# Patient Record
Sex: MALE | Race: BLACK OR AFRICAN AMERICAN | URBAN - METROPOLITAN AREA
[De-identification: names, ages, dates, MRNs, and addresses within clinical notes are randomized per-mention and may not be internally consistent; named-entity substitution may affect disease eponyms.]

---

## 2023-12-13 ENCOUNTER — TRANSCRIBE ORDERS (OUTPATIENT)
Facility: HOSPITAL | Age: 83
End: 2023-12-13

## 2023-12-13 DIAGNOSIS — R31.0 GROSS HEMATURIA: Primary | ICD-10-CM

## 2024-02-07 ENCOUNTER — HOSPITAL ENCOUNTER (OUTPATIENT)
Facility: HOSPITAL | Age: 84
Discharge: HOME | End: 2024-02-10
Attending: UROLOGY
Payer: MEDICARE

## 2024-02-07 DIAGNOSIS — R31.0 GROSS HEMATURIA: ICD-10-CM

## 2024-02-07 LAB — CREAT BLD-MCNC: 1.2 MG/DL (ref 0.6–1.3)

## 2024-02-07 PROCEDURE — 6360000004 HC RX CONTRAST MEDICATION: Performed by: UROLOGY

## 2024-02-07 PROCEDURE — 82565 ASSAY OF CREATININE: CPT

## 2024-02-07 PROCEDURE — 74178 CT ABD&PLV WO CNTR FLWD CNTR: CPT

## 2024-02-07 RX ADMIN — IOPAMIDOL 100 ML: 755 INJECTION, SOLUTION INTRAVENOUS at 09:47

## 2025-02-21 ENCOUNTER — HOSPITAL ENCOUNTER (INPATIENT)
Facility: HOSPITAL | Age: 85
LOS: 6 days | Discharge: HOME | DRG: 180 | End: 2025-03-01
Attending: STUDENT IN AN ORGANIZED HEALTH CARE EDUCATION/TRAINING PROGRAM | Admitting: HOSPITALIST
Payer: MEDICARE

## 2025-02-21 ENCOUNTER — APPOINTMENT (OUTPATIENT)
Facility: HOSPITAL | Age: 85
DRG: 180 | End: 2025-02-21
Payer: MEDICARE

## 2025-02-21 DIAGNOSIS — R91.8 LUNG MASS: ICD-10-CM

## 2025-02-21 DIAGNOSIS — J90 PLEURAL EFFUSION: ICD-10-CM

## 2025-02-21 DIAGNOSIS — C67.9 MALIGNANT NEOPLASM OF URINARY BLADDER, UNSPECIFIED SITE (HCC): ICD-10-CM

## 2025-02-21 DIAGNOSIS — R09.02 HYPOXEMIA: Primary | ICD-10-CM

## 2025-02-21 PROBLEM — J96.00 ACUTE RESPIRATORY FAILURE (HCC): Status: ACTIVE | Noted: 2025-02-21

## 2025-02-21 PROBLEM — Z85.51 HISTORY OF BLADDER CANCER: Status: ACTIVE | Noted: 2025-02-21

## 2025-02-21 PROBLEM — K21.9 GASTROESOPHAGEAL REFLUX DISEASE: Status: ACTIVE | Noted: 2025-02-21

## 2025-02-21 PROBLEM — Z86.0100 HISTORY OF COLON POLYPS: Status: ACTIVE | Noted: 2025-02-21

## 2025-02-21 PROBLEM — H91.90 HEARING LOSS: Status: ACTIVE | Noted: 2025-02-21

## 2025-02-21 LAB
ALBUMIN SERPL-MCNC: 2.9 G/DL (ref 3.5–5)
ALBUMIN SERPL-MCNC: 3 G/DL (ref 3.5–5)
ALBUMIN/GLOB SERPL: 0.6 (ref 1.1–2.2)
ALBUMIN/GLOB SERPL: 0.6 (ref 1.1–2.2)
ALP SERPL-CCNC: 44 U/L (ref 45–117)
ALP SERPL-CCNC: 44 U/L (ref 45–117)
ALT SERPL-CCNC: 9 U/L (ref 12–78)
ALT SERPL-CCNC: 9 U/L (ref 12–78)
ANION GAP SERPL CALC-SCNC: 2 MMOL/L (ref 2–12)
ANION GAP SERPL CALC-SCNC: 3 MMOL/L (ref 2–12)
APPEARANCE UR: CLEAR
APTT PPP: 27.1 SEC (ref 21.2–34.1)
AST SERPL W P-5'-P-CCNC: 12 U/L (ref 15–37)
AST SERPL W P-5'-P-CCNC: 14 U/L (ref 15–37)
BACTERIA URNS QL MICRO: NEGATIVE /HPF
BASOPHILS # BLD: 0.03 K/UL (ref 0–0.1)
BASOPHILS NFR BLD: 0.5 % (ref 0–1)
BILIRUB SERPL-MCNC: 0.8 MG/DL (ref 0.2–1)
BILIRUB SERPL-MCNC: 0.9 MG/DL (ref 0.2–1)
BILIRUB UR QL: NEGATIVE
BNP SERPL-MCNC: 693 PG/ML
BUN SERPL-MCNC: 14 MG/DL (ref 6–20)
BUN SERPL-MCNC: 17 MG/DL (ref 6–20)
BUN/CREAT SERPL: 13 (ref 12–20)
BUN/CREAT SERPL: 14 (ref 12–20)
CA-I BLD-MCNC: 8.9 MG/DL (ref 8.5–10.1)
CA-I BLD-MCNC: 9.1 MG/DL (ref 8.5–10.1)
CHLORIDE SERPL-SCNC: 105 MMOL/L (ref 97–108)
CHLORIDE SERPL-SCNC: 106 MMOL/L (ref 97–108)
CO2 SERPL-SCNC: 30 MMOL/L (ref 21–32)
CO2 SERPL-SCNC: 31 MMOL/L (ref 21–32)
COLOR UR: YELLOW
CREAT SERPL-MCNC: 1.12 MG/DL (ref 0.7–1.3)
CREAT SERPL-MCNC: 1.2 MG/DL (ref 0.7–1.3)
DIFFERENTIAL METHOD BLD: NORMAL
EKG ATRIAL RATE: 99 BPM
EKG DIAGNOSIS: NORMAL
EKG P AXIS: -14 DEGREES
EKG P-R INTERVAL: 146 MS
EKG Q-T INTERVAL: 360 MS
EKG QRS DURATION: 80 MS
EKG QTC CALCULATION (BAZETT): 462 MS
EKG R AXIS: 103 DEGREES
EKG T AXIS: 2 DEGREES
EKG VENTRICULAR RATE: 99 BPM
EOSINOPHIL # BLD: 0.12 K/UL (ref 0–0.4)
EOSINOPHIL NFR BLD: 1.8 % (ref 0–7)
EPITH CASTS URNS QL MICRO: ABNORMAL /LPF
ERYTHROCYTE [DISTWIDTH] IN BLOOD BY AUTOMATED COUNT: 13.1 % (ref 11.5–14.5)
GLOBULIN SER CALC-MCNC: 4.7 G/DL (ref 2–4)
GLOBULIN SER CALC-MCNC: 4.8 G/DL (ref 2–4)
GLUCOSE SERPL-MCNC: 161 MG/DL (ref 65–100)
GLUCOSE SERPL-MCNC: 82 MG/DL (ref 65–100)
GLUCOSE UR STRIP.AUTO-MCNC: NEGATIVE MG/DL
HCT VFR BLD AUTO: 40.8 % (ref 36.6–50.3)
HGB BLD-MCNC: 13 G/DL (ref 12.1–17)
HGB UR QL STRIP: ABNORMAL
IMM GRANULOCYTES # BLD AUTO: 0.02 K/UL (ref 0–0.04)
IMM GRANULOCYTES NFR BLD AUTO: 0.3 % (ref 0–0.5)
INR PPP: 1 (ref 0.9–1.1)
KETONES UR QL STRIP.AUTO: NEGATIVE MG/DL
LACTATE BLD-SCNC: 1.11 MMOL/L (ref 0.4–2)
LEUKOCYTE ESTERASE UR QL STRIP.AUTO: NEGATIVE
LYMPHOCYTES # BLD: 2.54 K/UL (ref 0.8–3.5)
LYMPHOCYTES NFR BLD: 38.1 % (ref 12–49)
MAGNESIUM SERPL-MCNC: 1.8 MG/DL (ref 1.6–2.4)
MCH RBC QN AUTO: 29.2 PG (ref 26–34)
MCHC RBC AUTO-ENTMCNC: 31.9 G/DL (ref 30–36.5)
MCV RBC AUTO: 91.7 FL (ref 80–99)
MONOCYTES # BLD: 0.49 K/UL (ref 0–1)
MONOCYTES NFR BLD: 7.4 % (ref 5–13)
MUCOUS THREADS URNS QL MICRO: ABNORMAL /LPF
NEUTS SEG # BLD: 3.46 K/UL (ref 1.8–8)
NEUTS SEG NFR BLD: 51.9 % (ref 32–75)
NITRITE UR QL STRIP.AUTO: NEGATIVE
NRBC # BLD: 0 K/UL (ref 0–0.01)
NRBC BLD-RTO: 0 PER 100 WBC
PERFORMED BY:: NORMAL
PH UR STRIP: 6 (ref 5–8)
PLATELET # BLD AUTO: 232 K/UL (ref 150–400)
PMV BLD AUTO: 10.2 FL (ref 8.9–12.9)
POTASSIUM SERPL-SCNC: 4 MMOL/L (ref 3.5–5.1)
POTASSIUM SERPL-SCNC: 4.1 MMOL/L (ref 3.5–5.1)
PROT SERPL-MCNC: 7.6 G/DL (ref 6.4–8.2)
PROT SERPL-MCNC: 7.8 G/DL (ref 6.4–8.2)
PROT UR STRIP-MCNC: NEGATIVE MG/DL
PROTHROMBIN TIME: 13.8 SEC (ref 11.9–14.6)
RBC # BLD AUTO: 4.45 M/UL (ref 4.1–5.7)
RBC #/AREA URNS HPF: ABNORMAL /HPF (ref 0–5)
SODIUM SERPL-SCNC: 138 MMOL/L (ref 136–145)
SODIUM SERPL-SCNC: 139 MMOL/L (ref 136–145)
SP GR UR REFRACTOMETRY: 1.02 (ref 1–1.03)
THERAPEUTIC RANGE: NORMAL SEC (ref 82–109)
TROPONIN I SERPL HS-MCNC: 23 NG/L (ref 0–76)
URINE CULTURE IF INDICATED: ABNORMAL
UROBILINOGEN UR QL STRIP.AUTO: 0.1 EU/DL (ref 0.1–1)
WBC # BLD AUTO: 6.7 K/UL (ref 4.1–11.1)
WBC URNS QL MICRO: ABNORMAL /HPF (ref 0–4)

## 2025-02-21 PROCEDURE — 6360000004 HC RX CONTRAST MEDICATION: Performed by: EMERGENCY MEDICINE

## 2025-02-21 PROCEDURE — 70450 CT HEAD/BRAIN W/O DYE: CPT

## 2025-02-21 PROCEDURE — 85025 COMPLETE CBC W/AUTO DIFF WBC: CPT

## 2025-02-21 PROCEDURE — 6360000002 HC RX W HCPCS: Performed by: EMERGENCY MEDICINE

## 2025-02-21 PROCEDURE — 83735 ASSAY OF MAGNESIUM: CPT

## 2025-02-21 PROCEDURE — 93005 ELECTROCARDIOGRAM TRACING: CPT | Performed by: STUDENT IN AN ORGANIZED HEALTH CARE EDUCATION/TRAINING PROGRAM

## 2025-02-21 PROCEDURE — 2500000003 HC RX 250 WO HCPCS: Performed by: EMERGENCY MEDICINE

## 2025-02-21 PROCEDURE — 74177 CT ABD & PELVIS W/CONTRAST: CPT

## 2025-02-21 PROCEDURE — 84484 ASSAY OF TROPONIN QUANT: CPT

## 2025-02-21 PROCEDURE — 99285 EMERGENCY DEPT VISIT HI MDM: CPT

## 2025-02-21 PROCEDURE — 71045 X-RAY EXAM CHEST 1 VIEW: CPT

## 2025-02-21 PROCEDURE — 83605 ASSAY OF LACTIC ACID: CPT

## 2025-02-21 PROCEDURE — 87154 CUL TYP ID BLD PTHGN 6+ TRGT: CPT

## 2025-02-21 PROCEDURE — 6370000000 HC RX 637 (ALT 250 FOR IP): Performed by: HOSPITALIST

## 2025-02-21 PROCEDURE — G0378 HOSPITAL OBSERVATION PER HR: HCPCS

## 2025-02-21 PROCEDURE — 81001 URINALYSIS AUTO W/SCOPE: CPT

## 2025-02-21 PROCEDURE — 80053 COMPREHEN METABOLIC PANEL: CPT

## 2025-02-21 PROCEDURE — 85610 PROTHROMBIN TIME: CPT

## 2025-02-21 PROCEDURE — 87040 BLOOD CULTURE FOR BACTERIA: CPT

## 2025-02-21 PROCEDURE — 87077 CULTURE AEROBIC IDENTIFY: CPT

## 2025-02-21 PROCEDURE — 96374 THER/PROPH/DIAG INJ IV PUSH: CPT

## 2025-02-21 PROCEDURE — 36415 COLL VENOUS BLD VENIPUNCTURE: CPT

## 2025-02-21 PROCEDURE — 83880 ASSAY OF NATRIURETIC PEPTIDE: CPT

## 2025-02-21 PROCEDURE — 71275 CT ANGIOGRAPHY CHEST: CPT

## 2025-02-21 PROCEDURE — 2580000003 HC RX 258: Performed by: EMERGENCY MEDICINE

## 2025-02-21 PROCEDURE — 2500000003 HC RX 250 WO HCPCS: Performed by: HOSPITALIST

## 2025-02-21 PROCEDURE — 85730 THROMBOPLASTIN TIME PARTIAL: CPT

## 2025-02-21 RX ORDER — SODIUM CHLORIDE 9 MG/ML
INJECTION, SOLUTION INTRAVENOUS PRN
Status: DISCONTINUED | OUTPATIENT
Start: 2025-02-21 | End: 2025-03-01 | Stop reason: HOSPADM

## 2025-02-21 RX ORDER — MAGNESIUM SULFATE IN WATER 40 MG/ML
2000 INJECTION, SOLUTION INTRAVENOUS PRN
Status: DISCONTINUED | OUTPATIENT
Start: 2025-02-21 | End: 2025-03-01 | Stop reason: HOSPADM

## 2025-02-21 RX ORDER — IOPAMIDOL 755 MG/ML
100 INJECTION, SOLUTION INTRAVASCULAR
Status: COMPLETED | OUTPATIENT
Start: 2025-02-21 | End: 2025-02-21

## 2025-02-21 RX ORDER — POLYETHYLENE GLYCOL 3350 17 G/17G
17 POWDER, FOR SOLUTION ORAL DAILY PRN
Status: DISCONTINUED | OUTPATIENT
Start: 2025-02-21 | End: 2025-03-01 | Stop reason: HOSPADM

## 2025-02-21 RX ORDER — POTASSIUM CHLORIDE 1500 MG/1
40 TABLET, EXTENDED RELEASE ORAL PRN
Status: DISCONTINUED | OUTPATIENT
Start: 2025-02-21 | End: 2025-03-01 | Stop reason: HOSPADM

## 2025-02-21 RX ORDER — ONDANSETRON 2 MG/ML
4 INJECTION INTRAMUSCULAR; INTRAVENOUS EVERY 6 HOURS PRN
Status: DISCONTINUED | OUTPATIENT
Start: 2025-02-21 | End: 2025-03-01 | Stop reason: HOSPADM

## 2025-02-21 RX ORDER — TAMSULOSIN HYDROCHLORIDE 0.4 MG/1
0.4 CAPSULE ORAL DAILY
Status: DISCONTINUED | OUTPATIENT
Start: 2025-02-21 | End: 2025-03-01 | Stop reason: HOSPADM

## 2025-02-21 RX ORDER — ACETAMINOPHEN 325 MG/1
650 TABLET ORAL EVERY 6 HOURS PRN
Status: DISCONTINUED | OUTPATIENT
Start: 2025-02-21 | End: 2025-03-01 | Stop reason: HOSPADM

## 2025-02-21 RX ORDER — ONDANSETRON 4 MG/1
4 TABLET, ORALLY DISINTEGRATING ORAL EVERY 8 HOURS PRN
Status: DISCONTINUED | OUTPATIENT
Start: 2025-02-21 | End: 2025-03-01 | Stop reason: HOSPADM

## 2025-02-21 RX ORDER — TAMSULOSIN HYDROCHLORIDE 0.4 MG/1
0.4 CAPSULE ORAL DAILY
COMMUNITY

## 2025-02-21 RX ORDER — POTASSIUM CHLORIDE 7.45 MG/ML
10 INJECTION INTRAVENOUS PRN
Status: DISCONTINUED | OUTPATIENT
Start: 2025-02-21 | End: 2025-03-01 | Stop reason: HOSPADM

## 2025-02-21 RX ORDER — AMLODIPINE BESYLATE 5 MG/1
10 TABLET ORAL DAILY
Status: DISCONTINUED | OUTPATIENT
Start: 2025-02-21 | End: 2025-03-01 | Stop reason: HOSPADM

## 2025-02-21 RX ORDER — SODIUM CHLORIDE 0.9 % (FLUSH) 0.9 %
5-40 SYRINGE (ML) INJECTION PRN
Status: DISCONTINUED | OUTPATIENT
Start: 2025-02-21 | End: 2025-03-01 | Stop reason: HOSPADM

## 2025-02-21 RX ORDER — AMLODIPINE BESYLATE 10 MG/1
10 TABLET ORAL DAILY
COMMUNITY

## 2025-02-21 RX ORDER — ACETAMINOPHEN 650 MG/1
650 SUPPOSITORY RECTAL EVERY 6 HOURS PRN
Status: DISCONTINUED | OUTPATIENT
Start: 2025-02-21 | End: 2025-03-01 | Stop reason: HOSPADM

## 2025-02-21 RX ORDER — SODIUM CHLORIDE 0.9 % (FLUSH) 0.9 %
5-40 SYRINGE (ML) INJECTION EVERY 12 HOURS SCHEDULED
Status: DISCONTINUED | OUTPATIENT
Start: 2025-02-21 | End: 2025-03-01 | Stop reason: HOSPADM

## 2025-02-21 RX ORDER — ENOXAPARIN SODIUM 100 MG/ML
40 INJECTION SUBCUTANEOUS DAILY
Status: DISCONTINUED | OUTPATIENT
Start: 2025-02-21 | End: 2025-03-01 | Stop reason: HOSPADM

## 2025-02-21 RX ORDER — 0.9 % SODIUM CHLORIDE 0.9 %
1000 INTRAVENOUS SOLUTION INTRAVENOUS
Status: COMPLETED | OUTPATIENT
Start: 2025-02-21 | End: 2025-02-21

## 2025-02-21 RX ORDER — ASPIRIN 81 MG/1
81 TABLET, CHEWABLE ORAL DAILY
COMMUNITY
End: 2025-02-21

## 2025-02-21 RX ADMIN — SODIUM CHLORIDE 1000 ML: 0.9 INJECTION, SOLUTION INTRAVENOUS at 11:09

## 2025-02-21 RX ADMIN — CEFTRIAXONE SODIUM 2000 MG: 2 INJECTION, POWDER, FOR SOLUTION INTRAMUSCULAR; INTRAVENOUS at 11:08

## 2025-02-21 RX ADMIN — AMLODIPINE BESYLATE 10 MG: 5 TABLET ORAL at 20:45

## 2025-02-21 RX ADMIN — IOPAMIDOL 100 ML: 755 INJECTION, SOLUTION INTRAVENOUS at 11:39

## 2025-02-21 RX ADMIN — TAMSULOSIN HYDROCHLORIDE 0.4 MG: 0.4 CAPSULE ORAL at 20:45

## 2025-02-21 RX ADMIN — SODIUM CHLORIDE, PRESERVATIVE FREE 10 ML: 5 INJECTION INTRAVENOUS at 20:45

## 2025-02-21 ASSESSMENT — PAIN - FUNCTIONAL ASSESSMENT: PAIN_FUNCTIONAL_ASSESSMENT: 0-10

## 2025-02-21 ASSESSMENT — LIFESTYLE VARIABLES
HOW OFTEN DO YOU HAVE A DRINK CONTAINING ALCOHOL: NEVER
HOW MANY STANDARD DRINKS CONTAINING ALCOHOL DO YOU HAVE ON A TYPICAL DAY: PATIENT DOES NOT DRINK

## 2025-02-21 ASSESSMENT — PAIN SCALES - GENERAL: PAINLEVEL_OUTOF10: 0

## 2025-02-21 NOTE — ED PROVIDER NOTES
[facility] EMERGENCY DEPT  EMERGENCY DEPARTMENT HISTORY AND PHYSICAL EXAM      Date: 2/21/2025  Patient Name: Donny Delgadillo  MRN: [id number]  Birthdate 1940  Date of evaluation: 2/21/2025  Provider: Tyrell Villanueva MD   Note Started: 10:27 AM EST 2/21/25    HISTORY OF PRESENT ILLNESS     Chief Complaint   Patient presents with    hypoxia       History Provided By: Patient    HPI: Donny Delgadillo is a 84 y.o. male presents with hypoxia.  He states that 3 weeks ago he had a cystoscopy diagnosing bladder cancer by his urologist.  He has had some right rib pain posterior lateral ribs and right lateral ribs and right upper flank since then shortness of breath on exertion.  Denies chest pain cough fevers.  Went to his urologist today for follow-up procedure to assess the extent of cancer minutes he was hypoxic and sent into the ER.    PAST MEDICAL HISTORY   Past Medical History:  Past Medical History:   Diagnosis Date    Bladder cancer (HCC)     BPH with urinary obstruction     Diverticulosis of colon without hemorrhage     Essential hypertension     Gastroesophageal reflux disease 02/21/2025    Hearing loss 02/21/2025    History of colon polyps 02/21/2025       Past Surgical History:  No past surgical history on file.    Family History:  No family history on file.    Social History:       Allergies:  No Known Allergies    PCP: Radha Pena MD    Current Meds:   No current facility-administered medications for this encounter.     Current Outpatient Medications   Medication Sig Dispense Refill    tamsulosin (FLOMAX) 0.4 MG capsule Take 1 capsule by mouth daily      amLODIPine (NORVASC) 10 MG tablet Take 1 tablet by mouth daily         Social Determinants of Health:   Social Determinants of Health     Tobacco Use: Not on file   Alcohol Use: Not At Risk (2/21/2025)    AUDIT-C     Frequency of Alcohol Consumption: Never     Average Number of Drinks: Patient does not drink     Frequency of Binge Drinking: Never  reason(s) for their admission have been discussed with pt and/or available family. They convey agreement and understanding for the need to be admitted and for the admission diagnosis.     PATIENT REFERRED TO:  No follow-up provider specified.      DISCHARGE MEDICATIONS:     Medication List        ASK your doctor about these medications      amLODIPine 10 MG tablet  Commonly known as: NORVASC     tamsulosin 0.4 MG capsule  Commonly known as: FLOMAX                DISCONTINUED MEDICATIONS:  Current Discharge Medication List        STOP taking these medications       aspirin 81 MG chewable tablet Comments:   Reason for Stopping:               I am the Primary Clinician of Record. Tyrell Villanueva MD (electronically signed)    (Please note that parts of this dictation were completed with voice recognition software. Quite often unanticipated grammatical, syntax, homophones, and other interpretive errors are inadvertently transcribed by the computer software. Please disregards these errors. Please excuse any errors that have escaped final proofreading.)      Tyrell Villanueva MD  02/21/25 [id number]

## 2025-02-21 NOTE — PROGRESS NOTES
4 Eyes Skin Assessment     NAME:  Donny Delgadillo  YOB: 1940  MEDICAL RECORD NUMBER:  [id number]    The patient is being assessed for  Admission    I agree that at least one RN has performed a thorough Head to Toe Skin Assessment on the patient. ALL assessment sites listed below have been assessed.      Areas assessed by both nurses:    Head, Face, Ears, Shoulders, Back, Chest, Arms, Elbows, Hands, Sacrum. Buttock, Coccyx, Ischium, Legs. Feet and Heels, and Under Medical Devices         Does the Patient have a Wound? No noted wound(s)       Shaquille Prevention initiated by RN: Yes  Wound Care Orders initiated by RN: No    Pressure Injury (Stage 3,4, Unstageable, DTI, NWPT, and Complex wounds) if present, place Wound referral order by RN under : No    New Ostomies, if present place, Ostomy referral order under : No     Nurse 1 eSignature: Electronically signed by TASHIA DIA RN on 2/21/25 at 6:26 PM EST    **SHARE this note so that the co-signing nurse can place an eSignature**    Nurse 2 eSignature: Electronically signed by Mike Koo RN on 2/21/25 at 7:13 PM EST

## 2025-02-21 NOTE — ASSESSMENT & PLAN NOTE
- Known hx s/p reported surgical resection w/ radiographic evidence of asymmetric left bladder wall thickening on CT A/P  - Outpt Urology records and pathology requested  - Monitor UOP and for bleeding

## 2025-02-21 NOTE — ED TRIAGE NOTES
Pt went to urologist surgery office for a bladder biopsy and the pt was told to come to the hospital for low vital signs and they were not going to do the procedure today

## 2025-02-21 NOTE — ASSESSMENT & PLAN NOTE
- Baseline lung fxn unclear w/ acute onset hypoxia identified and no acute reactive lung dz present on exam  - Underlying Pleural effusions the suspected etiology with underlying malignancy the suspected etiology  - Monitor O2 support s/p thoracentesis

## 2025-02-21 NOTE — ASSESSMENT & PLAN NOTE
- Large RUL mass w/ mediastinal/hilar lymphadenopathy & secondary moderate right pleural effusion noted on CT chest  - Metastatic dz with secondary malignant effusion suspected w/ low suspicion for PNA @ this time  - IR consulted for thoracentesis and fluid studies/cytology ordered  - Additional inpt Urology evaluation per daytime Hospitalist team

## 2025-02-21 NOTE — ASSESSMENT & PLAN NOTE
- Chronic presentation w/o evidence of acute obstruction on CT A/P  - Resume Alpha blocker and monitor PVR

## 2025-02-21 NOTE — H&P
V2.0  History and Physical      Name:  Donny Delgadillo /Age/Sex: 1940  (84 y.o. male)   MRN & CSN:  [id number] & [id number] Encounter Date/Time: 25   Location:  [facility]/ PCP: Radha Pena MD       Hospital Day: 1    Assessment and Plan:   Donny Delgadillo is a 84 y.o. male with a pmh of bladder cancer who presents with Acute respiratory failure (HCC)    Hospital Problems             Last Modified POA    * (Principal) Acute respiratory failure (HCC) 2025 Yes    Lung mass with pleural effusion 2025 Yes    Malignant neoplasm of urinary bladder (HCC) 2025 Yes    Essential hypertension 2025 Yes    BPH with urinary obstruction 2025 Yes     Plan:  Lung mass with pleural effusion  - Large RUL mass w/ mediastinal/hilar lymphadenopathy & secondary moderate right pleural effusion noted on CT chest  - Metastatic dz with secondary malignant effusion suspected w/ low suspicion for PNA @ this time  - IR consulted for thoracentesis and fluid studies/cytology ordered  - Additional inpt Urology evaluation per daytime Hospitalist team    Acute respiratory failure (HCC)  - Baseline lung fxn unclear w/ acute onset hypoxia identified and no acute reactive lung dz present on exam  - Underlying Pleural effusions the suspected etiology with underlying malignancy the suspected etiology  - Monitor O2 support s/p thoracentesis    Essential hypertension  - Chronic presentation w/ SBP stable & home regimen reviewed  - Titrate for goal SBP<140     BPH with urinary obstruction  - Chronic presentation w/o evidence of acute obstruction on CT A/P  - Resume Alpha blocker and monitor PVR    Malignant neoplasm of urinary bladder (HCC)  - Known hx s/p reported surgical resection w/ radiographic evidence of asymmetric left bladder wall thickening on CT A/P  - Outpt Urology records and pathology requested  - Monitor UOP and for bleeding    Disposition:   Current Living situation: home  Expected Disposition: Home  cortical cysts, nonobstructing left intrarenal calculus, asymmetric left bladder wall thickening which may correlate with recently diagnosed bladder cancer. Other incidental findings described above. Electronically signed by AMANDA HOLLINGSWORTH    CT HEAD WO CONTRAST    Result Date: 2/21/2025  INDICATION: any sign of metastatic disease? Exam: Noncontrast CT of the brain is performed with 5 mm collimation. CT dose reduction was achieved with the use of the standardized protocol tailored for this examination and automatic exposure control for dose modulation. FINDINGS: There is no acute intracranial hemorrhage, mass, mass effect or herniation. Ventricular system is normal. The gray-white matter differentiation is well-preserved. The mastoid air cells are well pneumatized. The visualized paranasal sinuses are normal.     No acute intracranial hemorrhage, mass or infarct. Electronically signed by Jackson Mlegar MD    XR CHEST 1 VIEW    Result Date: 2/21/2025  EXAM: XR CHEST 1 VIEW INDICATION: Shortness of Breath COMPARISON: 4/15/2015. FINDINGS: A single view of the chest demonstrates a 7.9 x 4.3 cm oval opacity in the right upper lobe. There is a small right pleural effusion. Bilateral lower lobe pleuroparenchymal scarring versus atelectasis is noted. The cardiac and mediastinal contours and pulmonary vascularity are normal. The bones and soft tissues are within normal limits.     Large oval opacity right upper lobe is concerning for neoplasm. Correlation with nonemergent contrast-enhanced chest CT is recommended. Small right pleural effusion and bilateral lower lobe pleuroparenchymal scarring versus atelectasis. Electronically signed by RITU SNOW    Electronically signed by BREN WYNNE DO on 2/21/2025 at 4:37 PM

## 2025-02-22 ENCOUNTER — HOSPITAL ENCOUNTER (OUTPATIENT)
Facility: HOSPITAL | Age: 85
Setting detail: OBSERVATION
Discharge: HOME | End: 2025-02-25
Payer: MEDICARE

## 2025-02-22 LAB
ALBUMIN SERPL-MCNC: 2.8 G/DL (ref 3.5–5)
ALBUMIN/GLOB SERPL: 0.6 (ref 1.1–2.2)
ALP SERPL-CCNC: 39 U/L (ref 45–117)
ALT SERPL-CCNC: 10 U/L (ref 12–78)
ANION GAP SERPL CALC-SCNC: 1 MMOL/L (ref 2–12)
AST SERPL W P-5'-P-CCNC: 14 U/L (ref 15–37)
BILIRUB SERPL-MCNC: 0.8 MG/DL (ref 0.2–1)
BUN SERPL-MCNC: 13 MG/DL (ref 6–20)
BUN/CREAT SERPL: 13 (ref 12–20)
CA-I BLD-MCNC: 9.3 MG/DL (ref 8.5–10.1)
CHLORIDE SERPL-SCNC: 105 MMOL/L (ref 97–108)
CO2 SERPL-SCNC: 31 MMOL/L (ref 21–32)
CREAT SERPL-MCNC: 1.01 MG/DL (ref 0.7–1.3)
ERYTHROCYTE [DISTWIDTH] IN BLOOD BY AUTOMATED COUNT: 12.9 % (ref 11.5–14.5)
FLUAV RNA SPEC QL NAA+PROBE: NOT DETECTED
FLUBV RNA SPEC QL NAA+PROBE: NOT DETECTED
GLOBULIN SER CALC-MCNC: 4.6 G/DL (ref 2–4)
GLUCOSE SERPL-MCNC: 87 MG/DL (ref 65–100)
HCT VFR BLD AUTO: 38.9 % (ref 36.6–50.3)
HGB BLD-MCNC: 12.4 G/DL (ref 12.1–17)
MCH RBC QN AUTO: 29.2 PG (ref 26–34)
MCHC RBC AUTO-ENTMCNC: 31.9 G/DL (ref 30–36.5)
MCV RBC AUTO: 91.5 FL (ref 80–99)
NRBC # BLD: 0 K/UL (ref 0–0.01)
NRBC BLD-RTO: 0 PER 100 WBC
PLATELET # BLD AUTO: 227 K/UL (ref 150–400)
PMV BLD AUTO: 10.1 FL (ref 8.9–12.9)
POTASSIUM SERPL-SCNC: 4.3 MMOL/L (ref 3.5–5.1)
PROCALCITONIN SERPL-MCNC: <0.05 NG/ML
PROT SERPL-MCNC: 7.4 G/DL (ref 6.4–8.2)
RBC # BLD AUTO: 4.25 M/UL (ref 4.1–5.7)
SARS-COV-2 RNA RESP QL NAA+PROBE: NOT DETECTED
SODIUM SERPL-SCNC: 137 MMOL/L (ref 136–145)
WBC # BLD AUTO: 5.3 K/UL (ref 4.1–11.1)

## 2025-02-22 PROCEDURE — A9577 INJ MULTIHANCE: HCPCS | Performed by: STUDENT IN AN ORGANIZED HEALTH CARE EDUCATION/TRAINING PROGRAM

## 2025-02-22 PROCEDURE — 6370000000 HC RX 637 (ALT 250 FOR IP): Performed by: INTERNAL MEDICINE

## 2025-02-22 PROCEDURE — 6370000000 HC RX 637 (ALT 250 FOR IP): Performed by: HOSPITALIST

## 2025-02-22 PROCEDURE — 94761 N-INVAS EAR/PLS OXIMETRY MLT: CPT

## 2025-02-22 PROCEDURE — 72157 MRI CHEST SPINE W/O & W/DYE: CPT

## 2025-02-22 PROCEDURE — 84145 PROCALCITONIN (PCT): CPT

## 2025-02-22 PROCEDURE — G0378 HOSPITAL OBSERVATION PER HR: HCPCS

## 2025-02-22 PROCEDURE — 87636 SARSCOV2 & INF A&B AMP PRB: CPT

## 2025-02-22 PROCEDURE — 80053 COMPREHEN METABOLIC PANEL: CPT

## 2025-02-22 PROCEDURE — 6360000004 HC RX CONTRAST MEDICATION: Performed by: STUDENT IN AN ORGANIZED HEALTH CARE EDUCATION/TRAINING PROGRAM

## 2025-02-22 PROCEDURE — 72158 MRI LUMBAR SPINE W/O & W/DYE: CPT

## 2025-02-22 PROCEDURE — 72156 MRI NECK SPINE W/O & W/DYE: CPT

## 2025-02-22 PROCEDURE — 2500000003 HC RX 250 WO HCPCS: Performed by: HOSPITALIST

## 2025-02-22 PROCEDURE — 36415 COLL VENOUS BLD VENIPUNCTURE: CPT

## 2025-02-22 PROCEDURE — 94640 AIRWAY INHALATION TREATMENT: CPT

## 2025-02-22 PROCEDURE — 85027 COMPLETE CBC AUTOMATED: CPT

## 2025-02-22 RX ORDER — IPRATROPIUM BROMIDE AND ALBUTEROL SULFATE 2.5; .5 MG/3ML; MG/3ML
1 SOLUTION RESPIRATORY (INHALATION)
Status: DISCONTINUED | OUTPATIENT
Start: 2025-02-22 | End: 2025-02-24

## 2025-02-22 RX ORDER — DOXYCYCLINE 100 MG/1
100 CAPSULE ORAL EVERY 12 HOURS SCHEDULED
Status: DISCONTINUED | OUTPATIENT
Start: 2025-02-22 | End: 2025-03-01 | Stop reason: HOSPADM

## 2025-02-22 RX ORDER — PREDNISONE 20 MG/1
40 TABLET ORAL 2 TIMES DAILY
Status: DISCONTINUED | OUTPATIENT
Start: 2025-02-22 | End: 2025-02-23

## 2025-02-22 RX ORDER — ALBUTEROL SULFATE 90 UG/1
2 INHALANT RESPIRATORY (INHALATION)
Status: DISCONTINUED | OUTPATIENT
Start: 2025-02-23 | End: 2025-02-22

## 2025-02-22 RX ORDER — ALBUTEROL SULFATE 90 UG/1
2 INHALANT RESPIRATORY (INHALATION) EVERY 6 HOURS PRN
Status: DISCONTINUED | OUTPATIENT
Start: 2025-02-23 | End: 2025-03-01 | Stop reason: HOSPADM

## 2025-02-22 RX ADMIN — SODIUM CHLORIDE, PRESERVATIVE FREE 10 ML: 5 INJECTION INTRAVENOUS at 20:35

## 2025-02-22 RX ADMIN — PREDNISONE 40 MG: 20 TABLET ORAL at 20:35

## 2025-02-22 RX ADMIN — AMLODIPINE BESYLATE 10 MG: 5 TABLET ORAL at 15:10

## 2025-02-22 RX ADMIN — DOXYCYCLINE HYCLATE 100 MG: 100 CAPSULE ORAL at 20:35

## 2025-02-22 RX ADMIN — GADOBENATE DIMEGLUMINE 13 ML: 529 INJECTION, SOLUTION INTRAVENOUS at 18:41

## 2025-02-22 RX ADMIN — IPRATROPIUM BROMIDE AND ALBUTEROL SULFATE 1 DOSE: 2.5; .5 SOLUTION RESPIRATORY (INHALATION) at 22:14

## 2025-02-22 RX ADMIN — TAMSULOSIN HYDROCHLORIDE 0.4 MG: 0.4 CAPSULE ORAL at 15:10

## 2025-02-22 ASSESSMENT — PAIN SCALES - WONG BAKER: WONGBAKER_NUMERICALRESPONSE: NO HURT

## 2025-02-22 NOTE — CONSULTS
Pulmonology Consult    Subjective:   Consult Note: 2/22/2025 4:21 PM    Chief Complaint:   Chief Complaint   Patient presents with    hypoxia        This patient has been seen and evaluated at the request of Dr. Villanueva.     Patient is an 84-year-old male with a history of COPD, GERD, bladder cancer, BPH, and hypertension who presented to the hospital with hypoxemia and was found to have a large right upper lobe mass on chest imaging.  Patient seen and examined in his room on the floor this afternoon, case discussed in detail with the patient at the bedside.  Presented to the hospital with shortness of breath, placed on 1.5 L nasal cannula.  Please wean off for goal sats greater than 90% on room air, patient will need a walking O2 test prior to discharge.  Physical therapy consulted today, TTE currently pending.  Lactate 1.1, proBNP level 693, troponin negative x 1, LFTs normal, procalcitonin less than 0.05, INR 1.0, CBC/BMP relatively unremarkable.  Expanded pulmonary infectious workup pending, CT head on admission unremarkable.  CTA chest/abdomen/pelvis shows a large right upper lobe mass measuring 9.0 x 4.3 cm with surrounding nodularity, severe emphysema and bullae bilaterally, a small to moderate right-sided pleural effusion, and mediastinal/hilar adenopathy.  All of these findings are extremely concerning for underlying malignancy.  The pleural effusion is really not terribly large on CT imaging, but will consult IR for an evaluation for thoracentesis on Monday, all the appropriate pleural fluid labs have been ordered, patient will need a postprocedural chest x-ray.  If a thoracentesis is performed and the cytology is positive for malignancy, no biopsy needed, but I suspect that he will still need a CT-guided biopsy.  Additionally, we will also ask IR to evaluate for candidacy of right upper lobe mass biopsy.  He has severe emphysema, but it appears that the mass abuts the chest wall which would reduce the risk  quickly over the weekend.  -Encouragement given for quitting smoking  -Needs close outpatient pulmonary follow-up with PFTs after discharge    3.)  Right upper lobe mass  -CTA chest/abdomen/pelvis shows a large right upper lobe mass measuring 9.0 x 4.3 cm with surrounding nodularity, severe emphysema and bullae bilaterally, a small to moderate right-sided pleural effusion, and mediastinal/hilar adenopathy.    -All of these findings are extremely concerning for underlying malignancy.    -The pleural effusion is really not terribly large on CT imaging, but will consult IR for an evaluation for thoracentesis on Monday, all the appropriate pleural fluid labs have been ordered, patient will need a postprocedural chest x-ray.   -If a thoracentesis is performed and the cytology is positive for malignancy, no biopsy needed, but I suspect that he will still need a CT-guided biopsy.    -Additionally, we will also ask IR to evaluate for candidacy of right upper lobe mass biopsy.  He has severe emphysema, but it appears that the mass abuts the chest wall which would reduce the risk of pneumothorax.  Will await their evaluation on Monday.  -Oncology consulted as well    4.)  Right pleural effusion  -Highly concerning for malignant effusion given lung mass.  -IR consulted for right-sided thoracentesis on Monday  -All the appropriate pleural fluid labs have been ordered, patient will need a postprocedural chest x-ray    5.)  Bladder cancer  -Reportedly had a recent cystoscopy with diagnosis.  -Oncology consulted    6.)  Ex-smoker  -Long history of smoking, but patient has already quit, significant encouragement given.  -Strongly recommend outpatient pulmonary follow-up with PFTs      CODE STATUS: Full Code       Disposition and Family: Stable to transfer to floor.    Total time spent with patient: 55 mins      Caden Fallon DO  [facility] (PAT)  2/22/2025  4:21 PM

## 2025-02-22 NOTE — CARDIO/PULMONARY
Consult received, thank you. Note to follow.      Caden Fallon DO  [facility] ([facility])

## 2025-02-22 NOTE — PLAN OF CARE
Problem: Discharge Planning  Goal: Discharge to home or other facility with appropriate resources  2/22/2025 1146 by Falguni Turner RN  Outcome: Progressing  Flowsheets (Taken 2/22/2025 0940)  Discharge to home or other facility with appropriate resources:   Identify barriers to discharge with patient and caregiver   Arrange for needed discharge resources and transportation as appropriate   Identify discharge learning needs (meds, wound care, etc)  2/21/2025 2203 by Laura Hyman RN  Outcome: Progressing     Problem: Pain  Goal: Verbalizes/displays adequate comfort level or baseline comfort level  2/22/2025 1146 by Falguni Turner RN  Outcome: Progressing  2/21/2025 2203 by Laura Hyman RN  Outcome: Progressing     Problem: ABCDS Injury Assessment  Goal: Absence of physical injury  2/22/2025 1146 by Falguni Turner RN  Outcome: Progressing  2/21/2025 2203 by Laura Hyman RN  Outcome: Progressing     Problem: Respiratory - Adult  Goal: Achieves optimal ventilation and oxygenation  Outcome: Progressing     Problem: Skin/Tissue Integrity - Adult  Goal: Skin integrity remains intact  Outcome: Progressing  Goal: Incisions, wounds, or drain sites healing without S/S of infection  Outcome: Progressing  Goal: Oral mucous membranes remain intact  Outcome: Progressing

## 2025-02-22 NOTE — CARE COORDINATION
02/22/25 0929   Service Assessment   Patient Orientation Alert and Oriented   Cognition Alert   History Provided By Patient   Primary Caregiver Self   Support Systems Children   Patient's Healthcare Decision Maker is: Legal Next of Kin   PCP Verified by CM Yes   Last Visit to PCP Within last 3 months   Prior Functional Level Independent in ADLs/IADLs   Current Functional Level Independent in ADLs/IADLs   Can patient return to prior living arrangement Yes   Ability to make needs known: Good   Family able to assist with home care needs: Yes   Would you like for me to discuss the discharge plan with any other family members/significant others, and if so, who? Yes  (daughter)   Financial Resources Medicare   Community Resources None   Social/Functional History   Lives With Alone   Type of Home House   Home Layout One level   Discharge Planning   Patient expects to be discharged to: House   Services At/After Discharge   Confirm Follow Up Transport Self     CM met with the patient at the bedside. He confirmed demographics as correct. He lives alone in a one story house. Independent in ADL's. Denies using any DME. Drives himself to appointments.     Patient is currently on oxygen but does not use at baseline. CM will follow for any oxygen needs.    Advance Care Planning     General Advance Care Planning (ACP) Conversation    Date of Conversation: 2/22/2025  Conducted with: Patient with Decision Making Capacity  Other persons present: None    Healthcare Decision Maker:   Primary Decision Maker: RAFAL LINTON - Child - [phone]       Gen Garcia

## 2025-02-22 NOTE — PROGRESS NOTES
Hospitalist Progress Note               Daily Progress Note: 2/22/2025      Hospital Day: 2     Chief complaint:   Chief Complaint   Patient presents with    hypoxia        Brief HPI/ Hospital course to date:  Donny Delgadillo is a 84 y.o. male with a pmh of bladder cancer who presents with Acute respiratory failure (HCC).  History obtained from patient but limited outpt records available.  Pt described progressive symtoms of POTTER w/o pleurisy or hemoptysis.  Pt was afebrile, hemodynamically stable but hypoxic on arrival to ED after being evaluated by his outpt urologist.  Lung mass and effusion were noted on follow up CT chest and labwork stable.  Pt in fair condition during admission     --------  Patient is seen today for follow-up.   He denies headache, chest pain/palpitations, worsening shortness of breath, abdominal pain, urinary symptoms.  Family at bedside and all questions answered.  Patient otherwise has no complaints.  Nursing at bedside no concerns.      All ROS negative otherwise mentioned above.      Assessment and Plan:    Acute respiratory failure with hypoxia 2/2 to Pleural Effusion  -Patient presented with shortness of breath and hemoptysis  -Episode of desaturation of 88%.  Currently on 1.5 L NC  -CXR: Large oval opacity in the right upper lung concerning for neoplasm, small right pleural effusion and bilateral lower lobe parenchymal scarring  -CTA chest: 9 x 4.3 cm right upper lobe mass, severe emphysema, moderate right pleural effusion, mediastinal hilar lymphadenopathy, pulmonary hypertension  -IR consulted for thoracentesis on Monday  -Blood cultures pending that was ordered in ED  -Wean O2 as tolerated  -Do not suspect infection at this time as no leukocytosis, Pro-Jaydon negative, CXR with no infection, no fever    Lung mass complicated with pleural effusion  -Thoracentesis planned on Monday.  Pleural studies ordered  -Pulmonology following and appreciate recommendations  -Echo pending given

## 2025-02-23 ENCOUNTER — APPOINTMENT (OUTPATIENT)
Facility: HOSPITAL | Age: 85
DRG: 180 | End: 2025-02-23
Attending: HOSPITALIST
Payer: MEDICARE

## 2025-02-23 LAB
ACCESSION NUMBER, LLC1M: NORMAL
ACINETOBACTER CALCOAC BAUMANNII COMPLEX BY PCR: NOT DETECTED
ALBUMIN SERPL-MCNC: 2.8 G/DL (ref 3.5–5)
ANION GAP SERPL CALC-SCNC: 3 MMOL/L (ref 2–12)
BACTEROIDES FRAGILIS BY PCR: NOT DETECTED
BIOFIRE TEST COMMENT: NORMAL
BNP SERPL-MCNC: 207 PG/ML
BUN SERPL-MCNC: 21 MG/DL (ref 6–20)
BUN/CREAT SERPL: 21 (ref 12–20)
CA-I BLD-MCNC: 9 MG/DL (ref 8.5–10.1)
CANDIDA ALBICANS BY PCR: NOT DETECTED
CANDIDA AURIS BY PCR: NOT DETECTED
CANDIDA GLABRATA: NOT DETECTED
CANDIDA KRUSEI BY PCR: NOT DETECTED
CANDIDA PARAPSILOSIS BY PCR: NOT DETECTED
CANDIDA TROPICALIS BY PCR: NOT DETECTED
CHLORIDE SERPL-SCNC: 106 MMOL/L (ref 97–108)
CO2 SERPL-SCNC: 28 MMOL/L (ref 21–32)
CREAT SERPL-MCNC: 1.02 MG/DL (ref 0.7–1.3)
CRYPTOCOCCUS NEOFORMANS/GATTII BY PCR: NOT DETECTED
ECHO AO ASC DIAM: 3.9 CM
ECHO AO ASCENDING AORTA INDEX: 2.19 CM/M2
ECHO AO ROOT DIAM: 3.1 CM
ECHO AO ROOT INDEX: 1.74 CM/M2
ECHO AV AREA PEAK VELOCITY: 2.5 CM2
ECHO AV AREA VTI: 3 CM2
ECHO AV AREA/BSA PEAK VELOCITY: 1.4 CM2/M2
ECHO AV AREA/BSA VTI: 1.7 CM2/M2
ECHO AV MEAN GRADIENT: 5 MMHG
ECHO AV MEAN VELOCITY: 1 M/S
ECHO AV PEAK GRADIENT: 8 MMHG
ECHO AV PEAK VELOCITY: 1.4 M/S
ECHO AV VELOCITY RATIO: 0.64
ECHO AV VTI: 28.6 CM
ECHO BSA: 1.76 M2
ECHO EST RA PRESSURE: 3 MMHG
ECHO LA AREA 4C: 15.7 CM2
ECHO LA DIAMETER INDEX: 1.74 CM/M2
ECHO LA DIAMETER: 3.1 CM
ECHO LA MAJOR AXIS: 5.8 CM
ECHO LA TO AORTIC ROOT RATIO: 1
ECHO LA VOL MOD A4C: 33 ML (ref 18–58)
ECHO LA VOLUME INDEX MOD A4C: 19 ML/M2 (ref 16–34)
ECHO LV E' LATERAL VELOCITY: 8.38 CM/S
ECHO LV E' SEPTAL VELOCITY: 6.04 CM/S
ECHO LV EDV A4C: 98 ML
ECHO LV EDV INDEX A4C: 55 ML/M2
ECHO LV EF PHYSICIAN: 65 %
ECHO LV EJECTION FRACTION A4C: 76 %
ECHO LV ESV A4C: 23 ML
ECHO LV ESV INDEX A4C: 13 ML/M2
ECHO LV FRACTIONAL SHORTENING: 51 % (ref 28–44)
ECHO LV INTERNAL DIMENSION DIASTOLE INDEX: 2.19 CM/M2
ECHO LV INTERNAL DIMENSION DIASTOLIC: 3.9 CM (ref 4.2–5.9)
ECHO LV INTERNAL DIMENSION SYSTOLIC INDEX: 1.07 CM/M2
ECHO LV INTERNAL DIMENSION SYSTOLIC: 1.9 CM
ECHO LV IVSD: 1.4 CM (ref 0.6–1)
ECHO LV MASS 2D: 212.9 G (ref 88–224)
ECHO LV MASS INDEX 2D: 119.6 G/M2 (ref 49–115)
ECHO LV POSTERIOR WALL DIASTOLIC: 1.5 CM (ref 0.6–1)
ECHO LV RELATIVE WALL THICKNESS RATIO: 0.77
ECHO LVOT AREA: 3.8 CM2
ECHO LVOT AV VTI INDEX: 0.78
ECHO LVOT DIAM: 2.2 CM
ECHO LVOT MEAN GRADIENT: 2 MMHG
ECHO LVOT PEAK GRADIENT: 3 MMHG
ECHO LVOT PEAK VELOCITY: 0.9 M/S
ECHO LVOT STROKE VOLUME INDEX: 47.6 ML/M2
ECHO LVOT SV: 84.7 ML
ECHO LVOT VTI: 22.3 CM
ECHO MV A VELOCITY: 0.94 M/S
ECHO MV AREA VTI: 2.8 CM2
ECHO MV E DECELERATION TIME (DT): 335 MS
ECHO MV E VELOCITY: 0.67 M/S
ECHO MV E/A RATIO: 0.71
ECHO MV E/E' LATERAL: 8
ECHO MV E/E' RATIO (AVERAGED): 9.54
ECHO MV E/E' SEPTAL: 11.09
ECHO MV LVOT VTI INDEX: 1.37
ECHO MV MAX VELOCITY: 1.3 M/S
ECHO MV MEAN GRADIENT: 2 MMHG
ECHO MV MEAN VELOCITY: 0.7 M/S
ECHO MV PEAK GRADIENT: 7 MMHG
ECHO MV REGURGITANT PEAK GRADIENT: 96 MMHG
ECHO MV REGURGITANT PEAK VELOCITY: 4.9 M/S
ECHO MV REGURGITANT VTIA: 116 CM
ECHO MV VTI: 30.6 CM
ECHO PV MAX VELOCITY: 0.8 M/S
ECHO PV MEAN GRADIENT: 1 MMHG
ECHO PV MEAN VELOCITY: 0.5 M/S
ECHO PV PEAK GRADIENT: 3 MMHG
ECHO PV VTI: 18.2 CM
ECHO RA AREA 4C: 16.9 CM2
ECHO RA END SYSTOLIC VOLUME APICAL 4 CHAMBER INDEX BSA: 25 ML/M2
ECHO RA VOLUME: 45 ML
ECHO RIGHT VENTRICULAR SYSTOLIC PRESSURE (RVSP): 37 MMHG
ECHO RV BASAL DIMENSION: 4 CM
ECHO RV FREE WALL PEAK S': 12.4 CM/S
ECHO RV TAPSE: 1.9 CM (ref 1.7–?)
ECHO TV REGURGITANT MAX VELOCITY: 2.91 M/S
ECHO TV REGURGITANT PEAK GRADIENT: 34 MMHG
ECHO TV REGURGITANT VTI: 91.8 CM
ENTEROBACTER CLOACAE COMPLEX BY PCR: NOT DETECTED
ENTEROBACTERALES BY PCR: NOT DETECTED
ENTEROCOCCUS FAECALIS BY PCR: NOT DETECTED
ENTEROCOCCUS FAECIUM BY PCR: NOT DETECTED
ERYTHROCYTE [DISTWIDTH] IN BLOOD BY AUTOMATED COUNT: 12.7 % (ref 11.5–14.5)
ESCHERICHIA COLI: NOT DETECTED
GLUCOSE SERPL-MCNC: 135 MG/DL (ref 65–100)
HAEM INFLU DNA BLD POS QL NAA+NON-PROBE: NOT DETECTED
HCT VFR BLD AUTO: 37.8 % (ref 36.6–50.3)
HGB BLD-MCNC: 12.4 G/DL (ref 12.1–17)
KLEBSIELLA AEROGENES BY PCR: NOT DETECTED
KLEBSIELLA OXYTOCA BY PCR: NOT DETECTED
KLEBSIELLA PNEUMONIAE GROUP BY PCR: NOT DETECTED
LISTERIA MONOCYTOGENES BY PCR: NOT DETECTED
M PNEUMO IGM SER IA-ACNC: NONREACTIVE
MAGNESIUM SERPL-MCNC: 2.1 MG/DL (ref 1.6–2.4)
MCH RBC QN AUTO: 29.8 PG (ref 26–34)
MCHC RBC AUTO-ENTMCNC: 32.8 G/DL (ref 30–36.5)
MCV RBC AUTO: 90.9 FL (ref 80–99)
NEISSERIA MENINGITIDIS BY PCR: NOT DETECTED
NRBC # BLD: 0 K/UL (ref 0–0.01)
NRBC BLD-RTO: 0 PER 100 WBC
PHOSPHATE SERPL-MCNC: 3.7 MG/DL (ref 2.6–4.7)
PLATELET # BLD AUTO: 235 K/UL (ref 150–400)
PMV BLD AUTO: 10.4 FL (ref 8.9–12.9)
POTASSIUM SERPL-SCNC: 4.4 MMOL/L (ref 3.5–5.1)
PROTEUS BY PCR: NOT DETECTED
PSEUDOMONAS AERUGINOSA, PSAEP: NOT DETECTED
RBC # BLD AUTO: 4.16 M/UL (ref 4.1–5.7)
RESISTANT GENE TARGETS: NORMAL
SALMONELLA DNA BLD POS QL NAA+NON-PROBE: NOT DETECTED
SERRATIA MARCESCENS BY PCR: NOT DETECTED
SODIUM SERPL-SCNC: 137 MMOL/L (ref 136–145)
STAPHYLOCOCCUS AUREUS: NOT DETECTED
STAPHYLOCOCCUS EPIDERMIDIS BY PCR: NOT DETECTED
STAPHYLOCOCCUS LUGDUNENSIS BY PCR: NOT DETECTED
STAPHYLOCOCCUS: NOT DETECTED
STENOTROPHOMONAS MALTOPHILIA BY PCR: NOT DETECTED
STREPTOCOCCUS AGALACTIAE (GROUP B): NOT DETECTED
STREPTOCOCCUS PNEUMONIAE , SPNP: NOT DETECTED
STREPTOCOCCUS PYOGENES (GROUP A), SPYOP: NOT DETECTED
STREPTOCOCCUS: NOT DETECTED
WBC # BLD AUTO: 4.9 K/UL (ref 4.1–11.1)

## 2025-02-23 PROCEDURE — 1100000000 HC RM PRIVATE

## 2025-02-23 PROCEDURE — G0378 HOSPITAL OBSERVATION PER HR: HCPCS

## 2025-02-23 PROCEDURE — 80069 RENAL FUNCTION PANEL: CPT

## 2025-02-23 PROCEDURE — 94640 AIRWAY INHALATION TREATMENT: CPT

## 2025-02-23 PROCEDURE — 86738 MYCOPLASMA ANTIBODY: CPT

## 2025-02-23 PROCEDURE — 6370000000 HC RX 637 (ALT 250 FOR IP): Performed by: INTERNAL MEDICINE

## 2025-02-23 PROCEDURE — 36415 COLL VENOUS BLD VENIPUNCTURE: CPT

## 2025-02-23 PROCEDURE — 93306 TTE W/DOPPLER COMPLETE: CPT

## 2025-02-23 PROCEDURE — 94761 N-INVAS EAR/PLS OXIMETRY MLT: CPT

## 2025-02-23 PROCEDURE — 85027 COMPLETE CBC AUTOMATED: CPT

## 2025-02-23 PROCEDURE — 83735 ASSAY OF MAGNESIUM: CPT

## 2025-02-23 PROCEDURE — 2700000000 HC OXYGEN THERAPY PER DAY

## 2025-02-23 PROCEDURE — 83880 ASSAY OF NATRIURETIC PEPTIDE: CPT

## 2025-02-23 PROCEDURE — 2500000003 HC RX 250 WO HCPCS: Performed by: HOSPITALIST

## 2025-02-23 PROCEDURE — 6370000000 HC RX 637 (ALT 250 FOR IP): Performed by: HOSPITALIST

## 2025-02-23 PROCEDURE — 6360000002 HC RX W HCPCS: Performed by: HOSPITALIST

## 2025-02-23 RX ORDER — PREDNISONE 20 MG/1
40 TABLET ORAL DAILY
Status: COMPLETED | OUTPATIENT
Start: 2025-02-24 | End: 2025-02-26

## 2025-02-23 RX ORDER — PREDNISONE 20 MG/1
40 TABLET ORAL DAILY
Status: DISCONTINUED | OUTPATIENT
Start: 2025-02-24 | End: 2025-02-23

## 2025-02-23 RX ADMIN — SODIUM CHLORIDE, PRESERVATIVE FREE 10 ML: 5 INJECTION INTRAVENOUS at 10:04

## 2025-02-23 RX ADMIN — TAMSULOSIN HYDROCHLORIDE 0.4 MG: 0.4 CAPSULE ORAL at 10:04

## 2025-02-23 RX ADMIN — DOXYCYCLINE HYCLATE 100 MG: 100 CAPSULE ORAL at 10:04

## 2025-02-23 RX ADMIN — IPRATROPIUM BROMIDE AND ALBUTEROL SULFATE 1 DOSE: 2.5; .5 SOLUTION RESPIRATORY (INHALATION) at 13:56

## 2025-02-23 RX ADMIN — PREDNISONE 40 MG: 20 TABLET ORAL at 10:04

## 2025-02-23 RX ADMIN — AMLODIPINE BESYLATE 10 MG: 5 TABLET ORAL at 10:04

## 2025-02-23 RX ADMIN — DOXYCYCLINE HYCLATE 100 MG: 100 CAPSULE ORAL at 21:24

## 2025-02-23 RX ADMIN — ENOXAPARIN SODIUM 40 MG: 100 INJECTION SUBCUTANEOUS at 10:04

## 2025-02-23 RX ADMIN — SODIUM CHLORIDE, PRESERVATIVE FREE 10 ML: 5 INJECTION INTRAVENOUS at 21:24

## 2025-02-23 RX ADMIN — IPRATROPIUM BROMIDE AND ALBUTEROL SULFATE 1 DOSE: 2.5; .5 SOLUTION RESPIRATORY (INHALATION) at 21:25

## 2025-02-23 RX ADMIN — IPRATROPIUM BROMIDE AND ALBUTEROL SULFATE 1 DOSE: 2.5; .5 SOLUTION RESPIRATORY (INHALATION) at 08:14

## 2025-02-23 ASSESSMENT — PAIN SCALES - WONG BAKER
WONGBAKER_NUMERICALRESPONSE: NO HURT

## 2025-02-23 NOTE — PROGRESS NOTES
Hospitalist Progress Note               Daily Progress Note: 2/23/2025      Hospital Day: 3     Chief complaint:   Chief Complaint   Patient presents with    hypoxia        Brief HPI/ Hospital course to date:  Donny Delgadillo is a 84 y.o. male with a pmh of bladder cancer who presents with Acute respiratory failure (HCC).  History obtained from patient but limited outpt records available.  Pt described progressive symtoms of POTTER w/o pleurisy or hemoptysis.  Pt was afebrile, hemodynamically stable but hypoxic on arrival to ED after being evaluated by his outpt urologist.  Lung mass and effusion were noted on follow up CT chest and labwork stable.  Pt in fair condition during admission     --------  Patient is seen today for follow-up.   He denies headache, chest pain/palpitations, worsening shortness of breath, abdominal pain, urinary symptoms.  Family at bedside and all questions answered.  Patient otherwise has no complaints.  Nursing at bedside no concerns.      All ROS negative otherwise mentioned above.      Assessment and Plan:    Acute respiratory failure with hypoxia 2/2 to Pleural Effusion  -Patient presented with shortness of breath and hemoptysis  -Episode of desaturation of 88%.  Currently on 1.5 L NC  -CXR: Large oval opacity in the right upper lung concerning for neoplasm, small right pleural effusion and bilateral lower lobe parenchymal scarring  -CTA chest: 9 x 4.3 cm right upper lobe mass, severe emphysema, moderate right pleural effusion, mediastinal hilar lymphadenopathy, pulmonary hypertension  -IR consulted for thoracentesis on Monday, if appropriate  -Blood cultures pending that was ordered in ED, no GTD  -On RA now       Lung mass complicated with pleural effusion  -Thoracentesis planned on Monday.  Pleural studies ordered  -Pulmonology following and appreciate recommendations  -Echo pending given possible pulmonary hypertension on imaging  -MRI cervical, thoracic, lumbar was ordered on  Narcotic analgesia for adverse drug reaction  [] Aggressive IV diuresis requiring serial monitoring for renal impairment and electrolyte derangements  [] Critical electrolyte abnormalities requiring IV replacement and close serial monitoring  [] SQ Insulin SS- monitoring serial FSBS for Hypoglycemic adverse drug reaction  [] Other -   [] Change in code status:    [] Decision to escalate care:    [] Major surgery/procedure with associated risk factors:    ----------------------------------------------------------------------  C. Data (any 2)  [x] Discussed current management and discharge planning options with Case Management.  [] Discussed management of the case with:    [] Telemetry personally reviewed and interpreted as documented above    [] Imaging personally reviewed and interpreted, includes:    [x] Data Review (any 3)  [x] All available Consultant notes from yesterday/today were reviewed  [x] All current labs were reviewed and interpreted for clinical significance   [x] Appropriate follow-up labs were ordered  [] Collateral history obtained from:       Time spent with patient including counseling, chart review and nursing communication: 38 minutes    Rafaela Kent MD

## 2025-02-23 NOTE — PROGRESS NOTES
Received Order for Telemetry     Donny Delgadillo   1940   [id number]   Acute respiratory failure (HCC) [J96.00]  Hypoxemia [R09.02]  Lung mass [R91.8]  Pleural effusion [J90]  Malignant neoplasm of urinary bladder, unspecified site (HCC) [C67.9]   Rafaela Knet MD     Tele Box # [id number] placed on patient at 1445  ER Room # N/A  Admitting to Room 202  Transferring Nurse yoly  Verified with Primary Nurse yoly at 1500

## 2025-02-23 NOTE — CONSULTS
Hematology and Oncology Inpatient Consult Note     Patient: Donny Delgadillo MRN: [id number]  SSN: [id number]    YOB: 1940  Age: 84 y.o.  Sex: male    Chief Complaint:    Reason for consult: Mass and history of bladder cancer.    Subjective:      Donny Delgadillo is a 84 y.o. male who is being seen for lung mass found on recent CT scan.    Mr. Delgadillo is a 84-year-old gentleman , with a past medical history of hypertension hearing problems GERD was recently diagnosed with bladder cancer and is followed by [facility].  Appears like the mass was a superficial bladder cancer and he underwent a cystoscopy evaluation and TURBT followed by [facility] a year ago.  Patient states he was recently told that the bladder mass has recurred and he will need a biopsy to check if there was muscle invasion.  He admits to hematuria.  Apparently he was at the procedure for bladder biopsy but was noted to be hypoxic and was sent to the hospital for evaluation.    CT chest abdomen pelvis was performed which showed large right upper lobe mass with surrounding parenchymal and pleural nodularity consistent with primary or secondary neoplasm.  Moderate right pleural effusion was seen.  Mediastinal and hilar lymphadenopathy.  Asymmetric left bladder wall thickening may correlate to the recent bladder cancer diagnosis.    He also appears to have some severe COPD changes also on the CT scan.  Patient is accompanied by her family in the room today they all had several questions all of these were answered to their apparent satisfaction.  He is currently on DuoNebs prednisone and antibiotics to optimize his breathing    He was an ex-smoker quit more than 20 years ago but admits to secondhand smoking from his friends.  No history of alcohol or drug abuse.    Past Medical History:   Diagnosis Date    Bladder cancer (HCC)     BPH with urinary obstruction     Diverticulosis of colon without hemorrhage     Essential hypertension      Gastroesophageal reflux disease 02/21/2025    Hearing loss 02/21/2025    History of colon polyps 02/21/2025     No past surgical history on file.   No family history on file.  Social History     Tobacco Use    Smoking status: Former     Types: Cigarettes, Cigars     Passive exposure: Past    Smokeless tobacco: Former   Substance Use Topics    Alcohol use: Not Currently      Current Facility-Administered Medications   Medication Dose Route Frequency Provider Last Rate Last Admin    ipratropium 0.5 mg-albuterol 2.5 mg (DUONEB) nebulizer solution 1 Dose  1 Dose Inhalation Q6H WA RT Caden Fallon DO   1 Dose at 02/23/25 1356    predniSONE (DELTASONE) tablet 40 mg  40 mg Oral BID Caden Fallon DO   40 mg at 02/23/25 1004    doxycycline hyclate (VIBRAMYCIN) capsule 100 mg  100 mg Oral 2 times per day Caden Fallon DO   100 mg at 02/23/25 1004    albuterol sulfate HFA (PROVENTIL;VENTOLIN;PROAIR) 108 (90 Base) MCG/ACT inhaler 2 puff  2 puff Inhalation Q6H PRN Juan Manuel Kelley MD        amLODIPine (NORVASC) tablet 10 mg  10 mg Oral Daily Dionicio Hathaway, DO   10 mg at 02/23/25 1004    tamsulosin (FLOMAX) capsule 0.4 mg  0.4 mg Oral Daily Dionicio Hathaway, DO   0.4 mg at 02/23/25 1004    sodium chloride flush 0.9 % injection 5-40 mL  5-40 mL IntraVENous 2 times per day Dionicio Hathaway, DO   10 mL at 02/23/25 1004    sodium chloride flush 0.9 % injection 5-40 mL  5-40 mL IntraVENous PRN Dionicio Hathaway, DO        0.9 % sodium chloride infusion   IntraVENous PRN Dionicio Hathaway, DO        potassium chloride (KLOR-CON M) extended release tablet 40 mEq  40 mEq Oral PRN Dionicio Hathaway, DO        Or    potassium bicarb-citric acid (EFFER-K) effervescent tablet 40 mEq  40 mEq Oral PRN Dionicio Hathaway, DO        Or    potassium chloride 10 mEq/100 mL IVPB (Peripheral Line)  10 mEq IntraVENous PRN Dionicio Hathaway, DO        magnesium sulfate 2000 mg in 50 mL IVPB premix  2,000 mg IntraVENous PRN Dionicio Hathaway, DO

## 2025-02-23 NOTE — PROGRESS NOTES
Pulmonology Progress Note    Subjective:     Chief Complaint:   Chief Complaint   Patient presents with    hypoxia        Patient seen and examined in his room on the floor this afternoon, no acute events overnight.  Now saturating well on 1 L nasal cannula, please wean off for goal sats greater than 90% on room air.  Patient needs a walking O2 test prior to discharge.  CBC/BMP stable, proBNP level decreasing today.  Negative influenza/COVID-19 testing, negative mycoplasma testing.  Case discussed in detail with the patient and his family at the bedside.  IR consulted to evaluate for thoracentesis and CT-guided biopsy, will have more information from them until tomorrow.  MRI spine completed, read pending.  Oncology consultation pending as well.  TTE completed with an EF of 65 to 70%, mild LVH, mild aortic/mitral valve regurgitation, and moderate tricuspid valve regurgitation.    I will set him up with an appointment in my office after discharge for COPD management.           Current Facility-Administered Medications   Medication Dose Route Frequency Provider Last Rate Last Admin    ipratropium 0.5 mg-albuterol 2.5 mg (DUONEB) nebulizer solution 1 Dose  1 Dose Inhalation Q6H WA RT Caden Fallon DO   1 Dose at 02/23/25 1356    predniSONE (DELTASONE) tablet 40 mg  40 mg Oral BID Caden Fallon DO   40 mg at 02/23/25 1004    doxycycline hyclate (VIBRAMYCIN) capsule 100 mg  100 mg Oral 2 times per day Caden Fallon DO   100 mg at 02/23/25 1004    albuterol sulfate HFA (PROVENTIL;VENTOLIN;PROAIR) 108 (90 Base) MCG/ACT inhaler 2 puff  2 puff Inhalation Q6H PRN Juan Manuel Kelley MD        amLODIPine (NORVASC) tablet 10 mg  10 mg Oral Daily Dionicio Hathaway DO   10 mg at 02/23/25 1004    tamsulosin (FLOMAX) capsule 0.4 mg  0.4 mg Oral Daily Dionicio Hathaway DO   0.4 mg at 02/23/25 1004    sodium chloride flush 0.9 % injection 5-40 mL  5-40 mL IntraVENous 2 times per day Dionicio Hathaway DO   10 mL at 02/23/25 1004     2.91 m/s    TR Peak Gradient 34 mmHg    Body Surface Area 1.76 m2    Fractional Shortening 2D 51 28 - 44 %    LV ESV Index A4C 13 mL/m2    LV EDV Index A4C 55 mL/m2    LVIDd Index 2.19 cm/m2    LVIDs Index 1.07 cm/m2    LV RWT Ratio 0.77     LV Mass 2D 212.9 88 - 224 g    LV Mass 2D Index 119.6 (A) 49 - 115 g/m2    MV E/A 0.71     E/E' Ratio (Averaged) 9.54     E/E' Lateral 8.00     E/E' Septal 11.09     LVOT Stroke Volume Index 47.6 mL/m2    LA Volume Index MOD A4C 19 16 - 34 ml/m2    LA Size Index 1.74 cm/m2    LA/AO Root Ratio 1.00     RA Volume Index A4C 25 mL/m2    Ao Root Index 1.74 cm/m2    Ascending Aorta Index 2.19 cm/m2    AV Velocity Ratio 0.64     LVOT:AV VTI Index 0.78     MYCHAL/BSA VTI 1.7 cm2/m2    MYCHAL/BSA Peak Velocity 1.4 cm2/m2    MV:LVOT VTI Index 1.37     Est. RA Pressure 3 mmHg    RVSP 37 mmHg    EF Physician 65 %       CTA chest/abdomen/pelvis (2/21/2025):  FINDINGS:     CHEST: This is a good quality study for the evaluation of pulmonary embolism to  the first subsegmental arterial level. There is no pulmonary embolism to this  level. Main pulmonary outflow tract 3.6 cm, enlarged.  MEDIASTINUM: Subcarinal lymph node 1.2 cm short axis diameter paraesophageal  lymph node 1.1 cm short axis diameter  JASBIR: Right hilar lymphadenopathy or adjacent pleural-parenchymal thickening.  1.6 cm short axis diameter, with a second similar soft tissue thickening 1.9 cm  short axis diameter.  THORACIC AORTA: No aneurysm.  HEART: Normal in size.  ESOPHAGUS: No wall thickening or dilatation.  TRACHEA/BRONCHI: Patent.  PLEURA: Moderately large right pleural effusion. Nodular pleural thickening  right upper lobe anteriorly and right lower lobe medially.  LUNGS: Right upper lobe mass 9.0 x 4.3 cm, correlating with the earlier chest  radiograph. Surrounding atelectasis, scar and emphysematous change, severe in  both the upper and lower lung fields..  BONES: No aggressive bone lesion or fracture.     ABDOMEN/PELVIS: No

## 2025-02-24 ENCOUNTER — APPOINTMENT (OUTPATIENT)
Facility: HOSPITAL | Age: 85
DRG: 180 | End: 2025-02-24
Attending: INTERNAL MEDICINE
Payer: MEDICARE

## 2025-02-24 LAB
ALBUMIN SERPL-MCNC: 2.7 G/DL (ref 3.5–5)
ANION GAP SERPL CALC-SCNC: 4 MMOL/L (ref 2–12)
BUN SERPL-MCNC: 21 MG/DL (ref 6–20)
BUN/CREAT SERPL: 24 (ref 12–20)
CA-I BLD-MCNC: 9.4 MG/DL (ref 8.5–10.1)
CHLORIDE SERPL-SCNC: 107 MMOL/L (ref 97–108)
CO2 SERPL-SCNC: 28 MMOL/L (ref 21–32)
CREAT SERPL-MCNC: 0.89 MG/DL (ref 0.7–1.3)
ERYTHROCYTE [DISTWIDTH] IN BLOOD BY AUTOMATED COUNT: 12.7 % (ref 11.5–14.5)
GLUCOSE SERPL-MCNC: 99 MG/DL (ref 65–100)
HCT VFR BLD AUTO: 36 % (ref 36.6–50.3)
HGB BLD-MCNC: 11.4 G/DL (ref 12.1–17)
LDH SERPL L TO P-CCNC: 195 U/L (ref 85–241)
MAGNESIUM SERPL-MCNC: 2 MG/DL (ref 1.6–2.4)
MCH RBC QN AUTO: 28.9 PG (ref 26–34)
MCHC RBC AUTO-ENTMCNC: 31.7 G/DL (ref 30–36.5)
MCV RBC AUTO: 91.1 FL (ref 80–99)
MRSA DNA SPEC QL NAA+PROBE: NOT DETECTED
NRBC # BLD: 0 K/UL (ref 0–0.01)
NRBC BLD-RTO: 0 PER 100 WBC
PHOSPHATE SERPL-MCNC: 3.9 MG/DL (ref 2.6–4.7)
PLATELET # BLD AUTO: 232 K/UL (ref 150–400)
PMV BLD AUTO: 10.5 FL (ref 8.9–12.9)
POTASSIUM SERPL-SCNC: 4 MMOL/L (ref 3.5–5.1)
RBC # BLD AUTO: 3.95 M/UL (ref 4.1–5.7)
SODIUM SERPL-SCNC: 139 MMOL/L (ref 136–145)
WBC # BLD AUTO: 8.5 K/UL (ref 4.1–11.1)

## 2025-02-24 PROCEDURE — 87040 BLOOD CULTURE FOR BACTERIA: CPT

## 2025-02-24 PROCEDURE — 80069 RENAL FUNCTION PANEL: CPT

## 2025-02-24 PROCEDURE — 97116 GAIT TRAINING THERAPY: CPT

## 2025-02-24 PROCEDURE — 85027 COMPLETE CBC AUTOMATED: CPT

## 2025-02-24 PROCEDURE — 6370000000 HC RX 637 (ALT 250 FOR IP): Performed by: INTERNAL MEDICINE

## 2025-02-24 PROCEDURE — 87641 MR-STAPH DNA AMP PROBE: CPT

## 2025-02-24 PROCEDURE — 97161 PT EVAL LOW COMPLEX 20 MIN: CPT

## 2025-02-24 PROCEDURE — 6370000000 HC RX 637 (ALT 250 FOR IP): Performed by: HOSPITALIST

## 2025-02-24 PROCEDURE — 1100000000 HC RM PRIVATE

## 2025-02-24 PROCEDURE — 94761 N-INVAS EAR/PLS OXIMETRY MLT: CPT

## 2025-02-24 PROCEDURE — 2500000003 HC RX 250 WO HCPCS: Performed by: HOSPITALIST

## 2025-02-24 PROCEDURE — 76604 US EXAM CHEST: CPT

## 2025-02-24 PROCEDURE — 83735 ASSAY OF MAGNESIUM: CPT

## 2025-02-24 PROCEDURE — 94640 AIRWAY INHALATION TREATMENT: CPT

## 2025-02-24 PROCEDURE — 83615 LACTATE (LD) (LDH) ENZYME: CPT

## 2025-02-24 PROCEDURE — 36415 COLL VENOUS BLD VENIPUNCTURE: CPT

## 2025-02-24 RX ORDER — IPRATROPIUM BROMIDE AND ALBUTEROL SULFATE 2.5; .5 MG/3ML; MG/3ML
1 SOLUTION RESPIRATORY (INHALATION) EVERY 4 HOURS PRN
Status: DISCONTINUED | OUTPATIENT
Start: 2025-02-24 | End: 2025-03-01 | Stop reason: HOSPADM

## 2025-02-24 RX ORDER — IPRATROPIUM BROMIDE AND ALBUTEROL SULFATE 2.5; .5 MG/3ML; MG/3ML
1 SOLUTION RESPIRATORY (INHALATION)
Status: DISCONTINUED | OUTPATIENT
Start: 2025-02-24 | End: 2025-03-01 | Stop reason: HOSPADM

## 2025-02-24 RX ADMIN — TAMSULOSIN HYDROCHLORIDE 0.4 MG: 0.4 CAPSULE ORAL at 08:19

## 2025-02-24 RX ADMIN — DOXYCYCLINE HYCLATE 100 MG: 100 CAPSULE ORAL at 21:53

## 2025-02-24 RX ADMIN — DOXYCYCLINE HYCLATE 100 MG: 100 CAPSULE ORAL at 08:19

## 2025-02-24 RX ADMIN — AMLODIPINE BESYLATE 10 MG: 5 TABLET ORAL at 08:19

## 2025-02-24 RX ADMIN — SODIUM CHLORIDE, PRESERVATIVE FREE 10 ML: 5 INJECTION INTRAVENOUS at 08:20

## 2025-02-24 RX ADMIN — PREDNISONE 40 MG: 20 TABLET ORAL at 08:19

## 2025-02-24 RX ADMIN — SODIUM CHLORIDE, PRESERVATIVE FREE 10 ML: 5 INJECTION INTRAVENOUS at 21:54

## 2025-02-24 RX ADMIN — IPRATROPIUM BROMIDE AND ALBUTEROL SULFATE 1 DOSE: 2.5; .5 SOLUTION RESPIRATORY (INHALATION) at 08:47

## 2025-02-24 RX ADMIN — IPRATROPIUM BROMIDE AND ALBUTEROL SULFATE 1 DOSE: 2.5; .5 SOLUTION RESPIRATORY (INHALATION) at 19:50

## 2025-02-24 ASSESSMENT — PAIN SCALES - WONG BAKER
WONGBAKER_NUMERICALRESPONSE: NO HURT
WONGBAKER_NUMERICALRESPONSE: NO HURT

## 2025-02-24 ASSESSMENT — PAIN SCALES - GENERAL: PAINLEVEL_OUTOF10: 0

## 2025-02-24 NOTE — PLAN OF CARE
Problem: Pain  Goal: Verbalizes/displays adequate comfort level or baseline comfort level  2/24/2025 0827 by Rubia Clayton RN  Outcome: Progressing  2/24/2025 0827 by Rubia Clayton RN  Outcome: Progressing  2/23/2025 2245 by Jessy Painting RN  Outcome: Progressing     Problem: ABCDS Injury Assessment  Goal: Absence of physical injury  2/24/2025 0827 by Rubia Clayton RN  Outcome: Progressing  2/24/2025 0827 by Rubia Clayton RN  Outcome: Progressing  2/23/2025 2245 by Jessy Painting RN  Outcome: Progressing     Problem: Respiratory - Adult  Goal: Achieves optimal ventilation and oxygenation  2/24/2025 0827 by Rubia Clayton RN  Outcome: Progressing  2/24/2025 0827 by Rubia Clayton RN  Outcome: Progressing  2/23/2025 2245 by Jessy Painting RN  Outcome: Progressing     Problem: Skin/Tissue Integrity - Adult  Goal: Skin integrity remains intact  2/24/2025 0827 by Rubia Clayton RN  Outcome: Progressing  2/24/2025 0827 by Rubia Clayton RN  Outcome: Progressing  2/23/2025 2245 by Jessy Painting RN  Outcome: Progressing     Problem: Skin/Tissue Integrity - Adult  Goal: Incisions, wounds, or drain sites healing without S/S of infection  2/24/2025 0827 by Rubia Clayton RN  Outcome: Progressing  2/24/2025 0827 by Rubia Clayton RN  Outcome: Progressing

## 2025-02-24 NOTE — PROGRESS NOTES
Pulmonology Progress Note    Subjective:     Patient seen and examined in his room on the floor this afternoon, no acute events overnight.    An attempt was done with ultrasound-guided left thoracentesis but apparently they could not get a good window and fluid was small in amount.    Will request IR to do CT-guided biopsy of left upper lobe mass to get tissue diagnosis.    He will follow-up with Dr. Fallon after discharge from hospital      Current Facility-Administered Medications   Medication Dose Route Frequency Provider Last Rate Last Admin    ipratropium 0.5 mg-albuterol 2.5 mg (DUONEB) nebulizer solution 1 Dose  1 Dose Inhalation BID RT Juan Manuel Kelley MD        ipratropium 0.5 mg-albuterol 2.5 mg (DUONEB) nebulizer solution 1 Dose  1 Dose Inhalation Q4H PRN Juan Manuel Kelley MD        predniSONE (DELTASONE) tablet 40 mg  40 mg Oral Daily Caden Fallon DO   40 mg at 02/24/25 0819    doxycycline hyclate (VIBRAMYCIN) capsule 100 mg  100 mg Oral 2 times per day Caden Fallon DO   100 mg at 02/24/25 0819    albuterol sulfate HFA (PROVENTIL;VENTOLIN;PROAIR) 108 (90 Base) MCG/ACT inhaler 2 puff  2 puff Inhalation Q6H PRN Juan Manuel Kelley MD        amLODIPine (NORVASC) tablet 10 mg  10 mg Oral Daily Dionicio Hathaway, DO   10 mg at 02/24/25 0819    tamsulosin (FLOMAX) capsule 0.4 mg  0.4 mg Oral Daily Dionicio Hathaway, DO   0.4 mg at 02/24/25 0819    sodium chloride flush 0.9 % injection 5-40 mL  5-40 mL IntraVENous 2 times per day Dionicio Hathaway, DO   10 mL at 02/24/25 0820    sodium chloride flush 0.9 % injection 5-40 mL  5-40 mL IntraVENous PRN Dionicio Hathaway, DO        0.9 % sodium chloride infusion   IntraVENous PRN Dionicio Hathaway, DO        potassium chloride (KLOR-CON M) extended release tablet 40 mEq  40 mEq Oral PRN Dionicio Hathaway, DO        Or    potassium bicarb-citric acid (EFFER-K) effervescent tablet 40 mEq  40 mEq Oral PRN Dionicio Hathaway, DO        Or    potassium chloride 10 mEq/100 mL IVPB

## 2025-02-24 NOTE — PROGRESS NOTES
Hospitalist Progress Note               Daily Progress Note: 2/24/2025      Hospital Day: 4     Chief complaint:   Chief Complaint   Patient presents with    hypoxia        Brief HPI/ Hospital course to date:  Donny Delgadillo is a 84 y.o. male with a pmh of bladder cancer who presents with Acute respiratory failure (HCC).  History obtained from patient but limited outpt records available.  Pt described progressive symtoms of POTTER w/o pleurisy or hemoptysis.  Pt was afebrile, hemodynamically stable but hypoxic on arrival to ED after being evaluated by his outpt urologist.  Lung mass and effusion were noted on follow up CT chest and labwork stable.  Pt in fair condition during admission     --------  Patient is seen today for follow-up.   He has no complaints. Okay with bx tomorrow and then discharge if stable.       All ROS negative otherwise mentioned above.      Assessment and Plan:    Acute respiratory failure with hypoxia 2/2 to COPD, resolved   -Patient presented with shortness of breath and hemoptysis  -Episode of desaturation of 88%.  Currently on room air now  -CXR: Large oval opacity in the right upper lung concerning for neoplasm, small right pleural effusion and bilateral lower lobe parenchymal scarring  -CTA chest: 9 x 4.3 cm right upper lobe mass, severe emphysema, moderate right pleural effusion, mediastinal hilar lymphadenopathy, pulmonary hypertension  -Unable to do thoracentesis 2/24 so not will try CT guided- biopsy tomorrow   -Blood cultures 2/21 now showing gram + cocci in clusters x1. Bio fire does not detect Staph aureus.  Will hold off on vancomycin.  If any signs of clinical deterioration or fever, will add vancomycin  -On RA now       Lung mass complicated with pleural effusion  -Thoracentesis planned on Monday.  Pleural studies ordered  -Pulmonology following and appreciate recommendations  -Echo 2/23: EF 65 to 70%, mildly increased wall thickness, concentric remodeling, moderate tricuspid  communication: 38 minutes    Rafaela Kent MD

## 2025-02-24 NOTE — PROGRESS NOTES
Hematology/Oncology   Progress Note    Patient: Donny Delgadillo MRN: [id number]     YOB: 1940  Age: 84 y.o.  Sex: male      Admit Date: 2/21/2025    LOS: 1 day     Reason for Consult:  lung mass found on recent CT scan    Subjective:     Patient went for thoracentesis today although there was no safe window for thoracentesis. Lung biopsy to take place 2/25.    Objective:     Vitals:    02/24/25 0315 02/24/25 0727 02/24/25 0822 02/24/25 0847   BP: 127/81  (!) 146/92    Pulse: 84 (!) 118 97 90   Resp: 16  18 18   Temp: 97.9 °F (36.6 °C)  98.1 °F (36.7 °C)    TempSrc: Oral  Oral    SpO2: 98%  97% 97%   Weight:       Height:          Physical Exam:  Constitutional: -American male,not in any acute distress or pain.  Eyes: Sclerae anicteric. Conjunctivae no pallor.  ENMT: Oral mucosa is moist, no thrush, mucositis, or petechiae.  Respiratory: Lungs are clear bilaterally.  Decreased breath sounds  Cardiovascular: Normal sinus rhythm  Abdomen: Soft, nontender, no hepatosplenomegaly. No guarding or rigidity. Bowel sounds present.  Extremities: No edema  Skin: No petechiae; no skin rash.  Neurologic: Alert/oriented    Lab/Data Review:  Recent Labs     02/22/25  1053 02/23/25  0452 02/24/25  0539   WBC 5.3 4.9 8.5   HGB 12.4 12.4 11.4*   HCT 38.9 37.8 36.0*    235 232     Recent Labs     02/21/25  1909 02/22/25  1053 02/23/25  0452 02/24/25  0539    137 137 139   K 4.1 4.3 4.4 4.0    105 106 107   CO2 31 31 28 28   BUN 14 13 21* 21*   MG  --   --  2.1 2.0   PHOS  --   --  3.7 3.9   ALT 9* 10*  --   --      No results for input(s): \"PH\", \"PCO2\", \"PO2\", \"HCO3\", \"FIO2\" in the last 72 hours.  Recent Results (from the past 24 hour(s))   Lactate Dehydrogenase    Collection Time: 02/24/25  5:39 AM   Result Value Ref Range     85 - 241 U/L   CBC    Collection Time: 02/24/25  5:39 AM   Result Value Ref Range    WBC 8.5 4.1 - 11.1 K/uL    RBC 3.95 (L) 4.10 - 5.70 M/uL    Hemoglobin 11.4  (L) 12.1 - 17.0 g/dL    Hematocrit 36.0 (L) 36.6 - 50.3 %    MCV 91.1 80.0 - 99.0 FL    MCH 28.9 26.0 - 34.0 PG    MCHC 31.7 30.0 - 36.5 g/dL    RDW 12.7 11.5 - 14.5 %    Platelets 232 150 - 400 K/uL    MPV 10.5 8.9 - 12.9 FL    Nucleated RBCs 0.0 0.0  WBC    nRBC 0.00 0.00 - 0.01 K/uL   Magnesium    Collection Time: 02/24/25  5:39 AM   Result Value Ref Range    Magnesium 2.0 1.6 - 2.4 mg/dL   Renal Function Panel    Collection Time: 02/24/25  5:39 AM   Result Value Ref Range    Sodium 139 136 - 145 mmol/L    Potassium 4.0 3.5 - 5.1 mmol/L    Chloride 107 97 - 108 mmol/L    CO2 28 21 - 32 mmol/L    Anion Gap 4 2 - 12 mmol/L    Glucose 99 65 - 100 mg/dL    BUN 21 (H) 6 - 20 mg/dL    Creatinine 0.89 0.70 - 1.30 mg/dL    BUN/Creatinine Ratio 24 (H) 12 - 20      Est, Glom Filt Rate 85 >60 ml/min/1.73m2    Calcium 9.4 8.5 - 10.1 mg/dL    Phosphorus 3.9 2.6 - 4.7 mg/dL    Albumin 2.7 (L) 3.5 - 5.0 g/dL      CT ABDOMEN PELVIS W IV CONTRAST   CTA CHEST W WO CONTRAST PE Eval   IMPRESSION:  1.CHEST: Large right upper lobe mass with surrounding parenchymal and pleural  nodularity, consistent with primary or secondary neoplasm. Severe emphysematous  change. Moderate right pleural effusion. Mediastinal and hilar lymphadenopathy.  Enlarged main pulmonary outflow tract, correlate for pulmonary hypertension. No  pulmonary embolus.  2. ABDOMEN/PELVIS: Bilateral renal cortical cysts, nonobstructing left  intrarenal calculus, asymmetric left bladder wall thickening which may correlate  with recently diagnosed bladder cancer. Other incidental findings described  above.    Assessment and Plan:     Large lung mass   -Measuring 9 x 4.3 cm on CTA chest along with mediastinal hilar lymphadenopathy and moderate right pleural effusion.  The presentation of this mass and his history suggest this might be a different primary like a primary lung cancer.  -Agree with thoracentesis and see if the cytology gives us the pathological diagnosis

## 2025-02-24 NOTE — PROGRESS NOTES
RT Nebulizer Bronchodilator Protocol Note    There is a bronchodilator order in the chart from a provider indicating to follow the RT Bronchodilator Protocol and there is an “Initiate RT Bronchodilator Protocol” order as well (see protocol at bottom of note).    CXR Findings:  No results found.    The findings from the last RT Protocol Assessment were as follows:  Smoking: Chronic pulmonary disease  Respiratory Pattern: Regular pattern and RR 12-20 bpm  Breath Sounds: Slightly diminished and/or crackles  Cough: Strong, spontaneous, non-productive  Indication for Bronchodilator Therapy: Decreased or absent breath sounds  Bronchodilator Assessment Score: 4    Aerosolized bronchodilator medication orders have been revised according to the RT Nebulizer Bronchodilator Protocol below.    Respiratory Therapist to perform RT Therapy Protocol Assessment initially then follow the protocol.  Repeat RT Therapy Protocol Assessment PRN for score 0-3 or on second treatment, BID, and PRN for scores above 3.    No Indications - adjust the frequency to every 6 hours PRN wheezing or bronchospasm, if no treatments needed after 48 hours then discontinue using Per Protocol order mode.     If indication present, adjust the RT bronchodilator orders based on the Bronchodilator Assessment Score as indicated below.  If a patient is on this medication at home then do not decrease Frequency below that used at home.    0-3 - enter or revise RT bronchodilator order(s) to equivalent RT Bronchodilator order with Frequency of every 4 hours PRN for wheezing or increased work of breathing using Per Protocol order mode.       4-6 - enter or revise RT Bronchodilator order(s) to two equivalent RT bronchodilator orders with one order with BID Frequency and one order with Frequency of every 4 hours PRN wheezing or increased work of breathing using Per Protocol order mode.         7-10 - enter or revise RT Bronchodilator order(s) to two equivalent RT  bronchodilator orders with one order with TID Frequency and one order with Frequency of every 4 hours PRN wheezing or increased work of breathing using Per Protocol order mode.       11-13 - enter or revise RT Bronchodilator order(s) to one equivalent RT bronchodilator order with QID Frequency and an Albuterol order with Frequency of every 4 hours PRN wheezing or increased work of breathing using Per Protocol order mode.      Greater than 13 - enter or revise RT Bronchodilator order(s) to one equivalent RT bronchodilator order with every 4 hours Frequency and an Albuterol order with Frequency of every 2 hours PRN wheezing or increased work of breathing using Per Protocol order mode.     RT to enter RT Home Evaluation for COPD & MDI Assessment order using Per Protocol order mode.    Electronically signed by RT Cecilio on 2/24/2025 at 10:41 AM

## 2025-02-24 NOTE — CARE COORDINATION
Current disposition remains home.  CM team will continue to follow the recommendations of the interdisciplinary team.

## 2025-02-24 NOTE — PLAN OF CARE
PHYSICAL THERAPY EVALUATION  Patient: Donny Delgadillo (84 y.o. male)  Date: 2/24/2025  Primary Diagnosis: Acute respiratory failure (HCC) [J96.00]  Hypoxemia [R09.02]  Lung mass [R91.8]  Pleural effusion [J90]  Malignant neoplasm of urinary bladder, unspecified site (HCC) [C67.9]       Precautions: Fall Risk                      Recommendations for nursing mobility: Out of bed to chair for meals and Assist x1    In place during session:     ASSESSMENT  Pt is a 84 y.o. male admitted on 2/21/2025 for resp failure; pt currently being treated for pleural effusion/weakness . Pt semi supine in bed upon PT arrival, agreeable to evaluation. Pt A&O x 4.  Of note, pt is on room air currently at rest SpO2 94%, dropped slowly from 94% to 87% with ambulation in hallway on room air, then maintained around 88-90% post ambulation.  PT notified nursing and respiratory.      Based on the objective data described below, the patient currently presents with impaired functional mobility, impaired strength, and decreased activity tolerance. (See below for objective details and assist levels).     Overall pt tolerated session fair today with overall SBA with bed mob and SBA/CGA for OOB transfers.  Pt amb approx 200ft with CGAx1, no LOB, no SOB noted however pt's SpO2 did drop as stated above during amb.  No outward symptoms demonstrated by pt.  Pt will benefit from continued skilled PT to address above deficits and return to PLOF. Current PT DC recommendation Intermittent physical therapy up to 2-3x/week in previous living setting once medically appropriate.     Start of Session End of Session   SPO2 (%) 94 88-90   Heart Rate (BPM) 112 124     GOALS:  FUNCTIONAL STATUS PRIOR TO ADMISSION: Patient was independent and active without use of DME.    HOME SUPPORT PRIOR TO ADMISSION: The patient lived alone with no local support.    Physical Therapy Goals  Initiated 2/24/2025  Pt stated goal: to go home    I with LE HEP x7 days  Mod I with bed  19-15 14-10 9-7 6   Modifier CH CI CJ CK CL CM CN         Physical Therapy Evaluation Charge Determination   History Examination Presentation Decision-Making   MEDIUM  Complexity : 1-2 comorbidities / personal factors will impact the outcome/ POC  MEDIUM Complexity : 3 Standardized tests and measures addressing body structure, function, activity limitation and / or participation in recreation  LOW Complexity : Stable, uncomplicated  Other outcome measures [facility] 6  LOW      Based on the above components, the patient evaluation is determined to be of the following complexity level: LOW    Pain Rating:  No c/o pain during session  Pain Intervention(s):       Activity Tolerance:   Fair     After treatment patient left in no apparent distress:   Bed locked and in lowest position Patient left in no apparent distress in bed, Call bell within reach, and Side rails x3 and nsg updated.    COMMUNICATION/EDUCATION:   The patient’s plan of care was discussed with: Registered nurse, Case management, and Respiratory therapist    Patient Education  Education Given To: Patient  Education Provided: Role of Therapy;Plan of Care  Education Provided Comments: educated on breathing technique  Education Method: Verbal;Demonstration  Education Outcome: Verbalized understanding;Continued education needed         Thank you for this referral.  Daniela Robison, PT  Minutes: 20

## 2025-02-24 NOTE — OR NURSING
Patient to IR For Thoracentesis  Patient identified, procedure verified   U/S used to visualize fluid  Trace amount of fluid available, no window for safe thoracentesis  Patient and family verbalized understanding  Lung biopsy discussed with Pt and family, agreeable to try tomorrow  NPO order placed for Midnight  Patient verbalized understanding    Patient transported back to nursing unit  Report called to Primary RNRubia

## 2025-02-25 LAB
ALBUMIN SERPL-MCNC: 2.7 G/DL (ref 3.5–5)
ANION GAP SERPL CALC-SCNC: 3 MMOL/L (ref 2–12)
BACTERIA SPEC CULT: ABNORMAL
BACTERIA SPEC CULT: ABNORMAL
BUN SERPL-MCNC: 19 MG/DL (ref 6–20)
BUN/CREAT SERPL: 22 (ref 12–20)
CA-I BLD-MCNC: 9.3 MG/DL (ref 8.5–10.1)
CHLORIDE SERPL-SCNC: 106 MMOL/L (ref 97–108)
CO2 SERPL-SCNC: 30 MMOL/L (ref 21–32)
CREAT SERPL-MCNC: 0.87 MG/DL (ref 0.7–1.3)
ERYTHROCYTE [DISTWIDTH] IN BLOOD BY AUTOMATED COUNT: 13 % (ref 11.5–14.5)
GLUCOSE SERPL-MCNC: 82 MG/DL (ref 65–100)
HCT VFR BLD AUTO: 37.5 % (ref 36.6–50.3)
HGB BLD-MCNC: 12 G/DL (ref 12.1–17)
Lab: ABNORMAL
MAGNESIUM SERPL-MCNC: 1.9 MG/DL (ref 1.6–2.4)
MCH RBC QN AUTO: 29.6 PG (ref 26–34)
MCHC RBC AUTO-ENTMCNC: 32 G/DL (ref 30–36.5)
MCV RBC AUTO: 92.4 FL (ref 80–99)
NRBC # BLD: 0 K/UL (ref 0–0.01)
NRBC BLD-RTO: 0 PER 100 WBC
PHOSPHATE SERPL-MCNC: 3.3 MG/DL (ref 2.6–4.7)
PLATELET # BLD AUTO: 225 K/UL (ref 150–400)
PMV BLD AUTO: 10.3 FL (ref 8.9–12.9)
POTASSIUM SERPL-SCNC: 4.1 MMOL/L (ref 3.5–5.1)
RBC # BLD AUTO: 4.06 M/UL (ref 4.1–5.7)
SODIUM SERPL-SCNC: 139 MMOL/L (ref 136–145)
WBC # BLD AUTO: 8.6 K/UL (ref 4.1–11.1)

## 2025-02-25 PROCEDURE — 94761 N-INVAS EAR/PLS OXIMETRY MLT: CPT

## 2025-02-25 PROCEDURE — 1100000000 HC RM PRIVATE

## 2025-02-25 PROCEDURE — 6370000000 HC RX 637 (ALT 250 FOR IP): Performed by: INTERNAL MEDICINE

## 2025-02-25 PROCEDURE — 6370000000 HC RX 637 (ALT 250 FOR IP): Performed by: HOSPITALIST

## 2025-02-25 PROCEDURE — 2500000003 HC RX 250 WO HCPCS: Performed by: HOSPITALIST

## 2025-02-25 PROCEDURE — 36415 COLL VENOUS BLD VENIPUNCTURE: CPT

## 2025-02-25 PROCEDURE — 94640 AIRWAY INHALATION TREATMENT: CPT

## 2025-02-25 PROCEDURE — 80069 RENAL FUNCTION PANEL: CPT

## 2025-02-25 PROCEDURE — 2700000000 HC OXYGEN THERAPY PER DAY

## 2025-02-25 PROCEDURE — 83735 ASSAY OF MAGNESIUM: CPT

## 2025-02-25 PROCEDURE — 85027 COMPLETE CBC AUTOMATED: CPT

## 2025-02-25 RX ADMIN — PREDNISONE 40 MG: 20 TABLET ORAL at 08:06

## 2025-02-25 RX ADMIN — SODIUM CHLORIDE, PRESERVATIVE FREE 10 ML: 5 INJECTION INTRAVENOUS at 21:27

## 2025-02-25 RX ADMIN — TAMSULOSIN HYDROCHLORIDE 0.4 MG: 0.4 CAPSULE ORAL at 08:06

## 2025-02-25 RX ADMIN — IPRATROPIUM BROMIDE AND ALBUTEROL SULFATE 1 DOSE: 2.5; .5 SOLUTION RESPIRATORY (INHALATION) at 08:29

## 2025-02-25 RX ADMIN — SODIUM CHLORIDE, PRESERVATIVE FREE 10 ML: 5 INJECTION INTRAVENOUS at 08:07

## 2025-02-25 RX ADMIN — AMLODIPINE BESYLATE 10 MG: 5 TABLET ORAL at 08:05

## 2025-02-25 RX ADMIN — DOXYCYCLINE HYCLATE 100 MG: 100 CAPSULE ORAL at 08:06

## 2025-02-25 RX ADMIN — DOXYCYCLINE HYCLATE 100 MG: 100 CAPSULE ORAL at 21:25

## 2025-02-25 RX ADMIN — IPRATROPIUM BROMIDE AND ALBUTEROL SULFATE 1 DOSE: 2.5; .5 SOLUTION RESPIRATORY (INHALATION) at 20:20

## 2025-02-25 ASSESSMENT — PAIN SCALES - GENERAL: PAINLEVEL_OUTOF10: 0

## 2025-02-25 NOTE — PLAN OF CARE
Problem: Discharge Planning  Goal: Discharge to home or other facility with appropriate resources  Outcome: Progressing     Problem: Pain  Goal: Verbalizes/displays adequate comfort level or baseline comfort level  Outcome: Progressing     Problem: ABCDS Injury Assessment  Goal: Absence of physical injury  Outcome: Progressing     Problem: Respiratory - Adult  Goal: Achieves optimal ventilation and oxygenation  Outcome: Progressing     Problem: Skin/Tissue Integrity - Adult  Goal: Skin integrity remains intact  Outcome: Progressing  Goal: Incisions, wounds, or drain sites healing without S/S of infection  Outcome: Progressing  Goal: Oral mucous membranes remain intact  Outcome: Progressing     Problem: Physical Therapy - Adult  Goal: By Discharge: Performs mobility at highest level of function for planned discharge setting.  See evaluation for individualized goals.  Description: FUNCTIONAL STATUS PRIOR TO ADMISSION: Patient was independent and active without use of DME.    HOME SUPPORT PRIOR TO ADMISSION: The patient lived alone with no local support.    Physical Therapy Goals  Initiated 2/24/2025  Pt stated goal: to go home    I with LE HEP x7 days  Mod I with bed mob x7 days  SBA with all transfers x7 days  Amb 200ft with SBAx1 without c/o SOB x 7 days without LOB      2/24/2025 1126 by Daniela Robison, PT  Outcome: Progressing

## 2025-02-25 NOTE — PROGRESS NOTES
Hospitalist Progress Note               Daily Progress Note: 2/25/2025      Hospital Day: 5     Chief complaint:   Chief Complaint   Patient presents with    hypoxia        Subjective:   Hospital course to date:    Donny Delgadillo is a 84 y.o. male presents with hypoxia.  He states that 3 weeks ago he had a cystoscopy diagnosing bladder cancer by his urologist.  He has had some right rib pain posterior lateral ribs and right lateral ribs and right upper flank since then shortness of breath on exertion.  Denies chest pain cough fevers.  Went to his urologist today for follow-up procedure to assess the extent of cancer minutes he was hypoxic and sent into the ER.     In the ED, CBC was unremarkable. Albumin 3.0. Magnesium and Troponin were unremarkable. BNP elevated to 693. Urine analysis showed hematuria. CXR showed right upper lobe mass concerning for neoplasm and small right pleural effusion. CT of the abdomen/pelvis showed \"bilateral cortical cysts, nonobstructing left intrarenal calculus.\"     2/23/2025: Pt denied chest pain, shortness of breath, HA, abdominal pain, and urinary symptoms. Pt was on room air. Oncology, urology, and pulmonology were following. Echo revealed EF 65-70%, mildly increased wall thickness, concentric remodeling, and tricuspid regurgitation. MRI revealed foraminal narrowing at C3-C4 through C7-T1, L3-L4, and L5-S1.     2/24/2025: Pt was seen lying comfortably awake and alert. Pt was currently on room air. Thoracentesis was unable to be performed by IR, so CT-guided biopsy was scheduled for the next day. Blood cultures on 2/21 returned with gram positive cocci x1, but bio fire did not detect staph aureus. Vancomycin was held unless pt exhibited signs of infection. RBC decreased to 3.95 and Hemoglobin 11.4.       --------  Patient is seen today for follow-up. Pt was seen lying comfortably in bed awake and alert. MRSA undetected. Pt denied HA, shortness of breath, dizziness, chest pain,  C3-C4 through C7-T1 as detailed.   Nonspecific subcentimeter spinous process intraosseous lesion within the T1   spinous process is indeterminate. Continued attention on follow-up advised.      Thoracic spine MRI demonstrates no obvious spinal mass or enhancing lesion.   No significant spondylotic changes or cord compression.   Partially imaged right upper lobe lung mass and associated at least moderate   right pleural effusion.      Lumbar spine MRI demonstrates no obvious suspicious mass or enhancing lesion.   Chronic findings include mild scoliosis with up to 1.0 cm lateral vertebral   listhesis and multilevel spondylosis with high-grade neuroforaminal narrowing at   L3-L4 through L5-S1 as detailed.      Electronically signed by RAMONE RIOS      CTA CHEST W WO CONTRAST PE Eval   Final Result   1.CHEST: Large right upper lobe mass with surrounding parenchymal and pleural   nodularity, consistent with primary or secondary neoplasm. Severe emphysematous   change. Moderate right pleural effusion. Mediastinal and hilar lymphadenopathy.   Enlarged main pulmonary outflow tract, correlate for pulmonary hypertension. No   pulmonary embolus.   2. ABDOMEN/PELVIS: Bilateral renal cortical cysts, nonobstructing left   intrarenal calculus, asymmetric left bladder wall thickening which may correlate   with recently diagnosed bladder cancer. Other incidental findings described   above.         Electronically signed by AMANDA HOLLINGSWORTH      CT ABDOMEN PELVIS W IV CONTRAST Additional Contrast? None   Final Result   1.CHEST: Large right upper lobe mass with surrounding parenchymal and pleural   nodularity, consistent with primary or secondary neoplasm. Severe emphysematous   change. Moderate right pleural effusion. Mediastinal and hilar lymphadenopathy.   Enlarged main pulmonary outflow tract, correlate for pulmonary hypertension. No   pulmonary embolus.   2. ABDOMEN/PELVIS: Bilateral renal cortical cysts, nonobstructing

## 2025-02-25 NOTE — PROGRESS NOTES
Hematology/Oncology   Progress Note    Patient: Donny Delgadillo MRN: [id number]     YOB: 1940  Age: 84 y.o.  Sex: male      Admit Date: 2/21/2025    LOS: 2 days     Reason for Consult:  lung mass found on recent CT scan    Subjective:     Patient went for thoracentesis yesterday although there was no safe window for thoracentesis.   CT-guided lung biopsy has been rescheduled for tomorrow due to lack of availability of radiologist     Objective:     Vitals:    02/25/25 0706 02/25/25 0715 02/25/25 0829 02/25/25 1100   BP:  (!) 151/98  (!) 141/90   Pulse: 68 90  95   Resp:  18  16   Temp:  98.2 °F (36.8 °C)  97.5 °F (36.4 °C)   TempSrc:  Oral  Oral   SpO2:  100% 98% 94%   Weight:       Height:          Physical Exam:  Constitutional: -American male,not in any acute distress or pain.  Eyes: Sclerae anicteric. Conjunctivae no pallor.  ENMT: Oral mucosa is moist, no thrush, mucositis, or petechiae.  Respiratory: Lungs are clear bilaterally.  Decreased breath sounds  Cardiovascular: Normal sinus rhythm  Abdomen: Soft, nontender, no hepatosplenomegaly. No guarding or rigidity. Bowel sounds present.  Extremities: No edema  Skin: No petechiae; no skin rash.  Neurologic: Alert/oriented    Lab/Data Review:  Recent Labs     02/23/25  0452 02/24/25  0539 02/25/25  0751   WBC 4.9 8.5 8.6   HGB 12.4 11.4* 12.0*   HCT 37.8 36.0* 37.5    232 225     Recent Labs     02/23/25  0452 02/24/25  0539 02/25/25  0751    139 139   K 4.4 4.0 4.1    107 106   CO2 28 28 30   BUN 21* 21* 19   MG 2.1 2.0 1.9   PHOS 3.7 3.9 3.3     No results for input(s): \"PH\", \"PCO2\", \"PO2\", \"HCO3\", \"FIO2\" in the last 72 hours.  Recent Results (from the past 24 hour(s))   MRSA by PCR    Collection Time: 02/24/25  4:49 PM    Specimen: Swab   Result Value Ref Range    MRSA by PCR Not Detected Not Detected     Culture, Blood 1    Collection Time: 02/24/25  7:00 PM    Specimen: Blood   Result Value Ref Range    Special  suspicious mass or enhancing lesion.  Chronic findings include mild scoliosis with up to 1.0 cm lateral vertebral  listhesis and multilevel spondylosis with high-grade neuroforaminal narrowing at  L3-L4 through L5-S1 as detailed.     Assessment and Plan:     Large lung mass   -Measuring 9 x 4.3 cm on CTA chest along with mediastinal hilar lymphadenopathy and moderate right pleural effusion.  The presentation of this mass and his history suggest this might be a different primary like a primary lung cancer.  -Agree with thoracentesis and see if the cytology gives us the pathological diagnosis otherwise a CT-guided biopsy of the lung mass is needed.    -MRI of the cervical thoracic and lumbar spines for met evaluation; report reviewed, will get PET scan for further assessment prior to Rad Onc involvement   -Outpatient PET/CT scan  -IR documented they will do lung biopsy 2/26 due to lack of radiologist 2/25    Right pleural effusion  -Highly concerning for malignant effusion given lung mass.  -IR consulted for right-sided thoracentesis but was unable to complete as there is no safe window for thoracentesis    History of bladder cancer   -s/p TURBT and BCG  -Now reports having  recurrence  with bladder mass and was scheduled for biopsy followed by [facility]  -CT during this admission is also suggestive of recurrence.    Inpatient workup complete after completion of lung biopsy.  Follow-up with Dr. Tillman as outpatient in 2 weeks for biopsy results and care plan discussion     This dictation was generated by voice recognition computer software. Although all attempts are made to edit the dictation for accuracy, there may be errors in the transcription that are not intended.     Signed By: Adrienne Martinez, BASILIA - CNP     February 25, 2025

## 2025-02-25 NOTE — PLAN OF CARE
Problem: Pain  Goal: Verbalizes/displays adequate comfort level or baseline comfort level  2/25/2025 0759 by Rubia Clayton RN  Outcome: Progressing  2/24/2025 2333 by Tiff Resendiz RN  Outcome: Progressing     Problem: ABCDS Injury Assessment  Goal: Absence of physical injury  2/25/2025 0759 by Rubia Clayton RN  Outcome: Progressing  2/24/2025 2333 by Tiff Resendiz RN  Outcome: Progressing     Problem: Respiratory - Adult  Goal: Achieves optimal ventilation and oxygenation  2/25/2025 0759 by Rubia Clayton RN  Outcome: Progressing  2/24/2025 2333 by Tiff Resendiz RN  Outcome: Progressing     Problem: Skin/Tissue Integrity - Adult  Goal: Skin integrity remains intact  2/25/2025 0759 by Rubia Clayton RN  Outcome: Progressing  2/24/2025 2333 by Tiff Resendiz RN  Outcome: Progressing     Problem: Skin/Tissue Integrity - Adult  Goal: Incisions, wounds, or drain sites healing without S/S of infection  2/25/2025 0759 by Rubia Clayton RN  Outcome: Progressing  2/24/2025 2333 by Tiff Resendiz RN  Outcome: Progressing

## 2025-02-25 NOTE — PROGRESS NOTES
Pulmonology Progress Note    Subjective:     Patient seen and examined in his room on the floor this afternoon, no acute events overnight.    An attempt was done with ultrasound-guided left thoracentesis but apparently they could not get a good window and fluid was small in amount.    CT-guided biopsy will be done tomorrow     He will follow-up with Dr. Fallon after discharge from hospital      Current Facility-Administered Medications   Medication Dose Route Frequency Provider Last Rate Last Admin    ipratropium 0.5 mg-albuterol 2.5 mg (DUONEB) nebulizer solution 1 Dose  1 Dose Inhalation BID RT Juan Manuel Kelley MD   1 Dose at 02/25/25 0829    ipratropium 0.5 mg-albuterol 2.5 mg (DUONEB) nebulizer solution 1 Dose  1 Dose Inhalation Q4H PRN Juan Manuel Kelley MD        predniSONE (DELTASONE) tablet 40 mg  40 mg Oral Daily Caden Fallon DO   40 mg at 02/25/25 0806    doxycycline hyclate (VIBRAMYCIN) capsule 100 mg  100 mg Oral 2 times per day Caden Fallon DO   100 mg at 02/25/25 0806    albuterol sulfate HFA (PROVENTIL;VENTOLIN;PROAIR) 108 (90 Base) MCG/ACT inhaler 2 puff  2 puff Inhalation Q6H PRN Juan Manuel Kelley MD        amLODIPine (NORVASC) tablet 10 mg  10 mg Oral Daily Dionicio Hathaway, DO   10 mg at 02/25/25 0805    tamsulosin (FLOMAX) capsule 0.4 mg  0.4 mg Oral Daily Dionicio Hathaway, DO   0.4 mg at 02/25/25 0806    sodium chloride flush 0.9 % injection 5-40 mL  5-40 mL IntraVENous 2 times per day Dionicio Hathaway, DO   10 mL at 02/25/25 0807    sodium chloride flush 0.9 % injection 5-40 mL  5-40 mL IntraVENous PRN Dionicio Hathaway, DO        0.9 % sodium chloride infusion   IntraVENous PRN Dionicio Hathaway, DO        potassium chloride (KLOR-CON M) extended release tablet 40 mEq  40 mEq Oral PRN Dionicio Hathaway, DO        Or    potassium bicarb-citric acid (EFFER-K) effervescent tablet 40 mEq  40 mEq Oral PRN Rivas, Dionicio A, DO        Or    potassium chloride 10 mEq/100 mL IVPB (Peripheral Line)  10 mEq  correlate for pulmonary hypertension. No  pulmonary embolus.  2. ABDOMEN/PELVIS: Bilateral renal cortical cysts, nonobstructing left  intrarenal calculus, asymmetric left bladder wall thickening which may correlate  with recently diagnosed bladder cancer. Other incidental findings described  above.      Assessment:     Patient is an 84-year-old male with a history of COPD, GERD, bladder cancer, BPH, and hypertension who presented to the hospital with hypoxemia and was found to have a large right upper lobe mass on chest imaging.    Plan:     1.)  Acute hypoxemic respiratory failure  -Likely secondary to acute exacerbation of COPD with underlying lung mass.  -Infectious workup ordered.  No growth on cultures   -Bronchodilator therapy/systemic steroids/antibiotics outlined below.  -Patient is on room air and saturating well.  2.)  Acute exacerbation of COPD  -Agree with DuoNeb nebulizer treatments every 6 hours while awake, oral doxycycline for 5 days, and prednisone 40 mg p.o. daily for 5 days.  -Overall seems improved today  -Encouragement given for quitting smoking  -Needs close outpatient pulmonary follow-up with PFTs after discharge. Dr. Fallon will see the patient after discharge.    3.)  Right upper lobe mass  -CTA chest/abdomen/pelvis shows a large right upper lobe mass measuring 9.0 x 4.3 cm with surrounding nodularity, severe emphysema and bullae bilaterally, a small to moderate right-sided pleural effusion, and mediastinal/hilar adenopathy.    -All of these findings are extremely concerning for underlying malignancy.    -IR was consulted for left thoracentesis, it was attempted but ultrasound did not reveal a whole lot of fluid and was poor window as well.    He is scheduled for CT-guided biopsy tomorrow for his left upper lobe lung mass   He will be kept n.p.o. after midnight   4.)  Right pleural effusion  -Thoracentesis attempted but not enough fluid was visualized on ultrasound  5.)  Bladder

## 2025-02-25 NOTE — PROGRESS NOTES
Hospitalist Progress Note               Daily Progress Note: 2/25/2025      Hospital Day: 5     Chief complaint:   Chief Complaint   Patient presents with    hypoxia        Subjective:   Hospital course to date:    Donny Delgadillo is a 84 y.o. male presents with hypoxia.  He states that 3 weeks ago he had a cystoscopy diagnosing bladder cancer by his urologist.  He has had some right rib pain posterior lateral ribs and right lateral ribs and right upper flank since then shortness of breath on exertion.  Denies chest pain cough fevers.  Went to his urologist today for follow-up procedure to assess the extent of cancer minutes he was hypoxic and sent into the ER.     In the ED, CBC was unremarkable. Albumin 3.0. Magnesium and Troponin were unremarkable. BNP elevated to 693. Urine analysis showed hematuria. CXR showed right upper lobe mass concerning for neoplasm and small right pleural effusion. CT of the abdomen/pelvis showed \"bilateral cortical cysts, nonobstructing left intrarenal calculus.\"     2/23/2025: Pt denied chest pain, shortness of breath, HA, abdominal pain, and urinary symptoms. Pt was on room air. Oncology, urology, and pulmonology were following. Echo revealed EF 65-70%, mildly increased wall thickness, concentric remodeling, and tricuspid regurgitation. MRI revealed foraminal narrowing at C3-C4 through C7-T1, L3-L4, and L5-S1.     2/24/2025: Pt was seen lying comfortably awake and alert. Pt was currently on room air. Thoracentesis was unable to be performed by IR, so CT-guided biopsy was scheduled for the next day. Blood cultures on 2/21 returned with gram positive cocci x1, but bio fire did not detect staph aureus. Vancomycin was held unless pt exhibited signs of infection. RBC decreased to 3.95 and Hemoglobin 11.4.       --------  Patient is seen today for follow-up. Pt was seen lying comfortably in bed awake and alert. MRSA undetected. Pt denied HA, shortness of breath, dizziness, chest pain,  include mild scoliosis with up to 1.0 cm lateral vertebral   listhesis and multilevel spondylosis with high-grade neuroforaminal narrowing at   L3-L4 through L5-S1 as detailed.      Electronically signed by RAMONE RIOS      CTA CHEST W WO CONTRAST PE Eval   Final Result   1.CHEST: Large right upper lobe mass with surrounding parenchymal and pleural   nodularity, consistent with primary or secondary neoplasm. Severe emphysematous   change. Moderate right pleural effusion. Mediastinal and hilar lymphadenopathy.   Enlarged main pulmonary outflow tract, correlate for pulmonary hypertension. No   pulmonary embolus.   2. ABDOMEN/PELVIS: Bilateral renal cortical cysts, nonobstructing left   intrarenal calculus, asymmetric left bladder wall thickening which may correlate   with recently diagnosed bladder cancer. Other incidental findings described   above.         Electronically signed by AMANDA HOLLINGSWORTH      CT ABDOMEN PELVIS W IV CONTRAST Additional Contrast? None   Final Result   1.CHEST: Large right upper lobe mass with surrounding parenchymal and pleural   nodularity, consistent with primary or secondary neoplasm. Severe emphysematous   change. Moderate right pleural effusion. Mediastinal and hilar lymphadenopathy.   Enlarged main pulmonary outflow tract, correlate for pulmonary hypertension. No   pulmonary embolus.   2. ABDOMEN/PELVIS: Bilateral renal cortical cysts, nonobstructing left   intrarenal calculus, asymmetric left bladder wall thickening which may correlate   with recently diagnosed bladder cancer. Other incidental findings described   above.         Electronically signed by AMANDA HOLLINGSWORTH      CT HEAD WO CONTRAST   Final Result   No acute intracranial hemorrhage, mass or infarct.          Electronically signed by Jackson Melgar MD      XR CHEST 1 VIEW   Final Result   Large oval opacity right upper lobe is concerning for neoplasm.   Correlation with nonemergent contrast-enhanced chest CT is

## 2025-02-25 NOTE — CARE COORDINATION
CM met with patient and son at bedside to discuss therapy recommendations for Intermittent physical therapy up to 2-3x/week in previous living setting.  Patient and son are agreeable.  Freedom of choice for no preference.  CM sent home health referrals. Patient has been accepted with [facility] as long as patient does not need any pleural drains.

## 2025-02-26 ENCOUNTER — APPOINTMENT (OUTPATIENT)
Facility: HOSPITAL | Age: 85
DRG: 180 | End: 2025-02-26
Payer: MEDICARE

## 2025-02-26 ENCOUNTER — APPOINTMENT (OUTPATIENT)
Facility: HOSPITAL | Age: 85
DRG: 180 | End: 2025-02-26
Attending: INTERNAL MEDICINE
Payer: MEDICARE

## 2025-02-26 LAB
BACTERIA SPEC CULT: ABNORMAL
BACTERIA SPEC CULT: ABNORMAL
Lab: ABNORMAL

## 2025-02-26 PROCEDURE — 88305 TISSUE EXAM BY PATHOLOGIST: CPT

## 2025-02-26 PROCEDURE — 99156 MOD SED OTH PHYS/QHP 5/>YRS: CPT

## 2025-02-26 PROCEDURE — 6370000000 HC RX 637 (ALT 250 FOR IP): Performed by: INTERNAL MEDICINE

## 2025-02-26 PROCEDURE — 88333 PATH CONSLTJ SURG CYTO XM 1: CPT

## 2025-02-26 PROCEDURE — C1894 INTRO/SHEATH, NON-LASER: HCPCS

## 2025-02-26 PROCEDURE — 2500000003 HC RX 250 WO HCPCS: Performed by: HOSPITALIST

## 2025-02-26 PROCEDURE — 94640 AIRWAY INHALATION TREATMENT: CPT

## 2025-02-26 PROCEDURE — 0BBC3ZX EXCISION OF RIGHT UPPER LUNG LOBE, PERCUTANEOUS APPROACH, DIAGNOSTIC: ICD-10-PCS | Performed by: STUDENT IN AN ORGANIZED HEALTH CARE EDUCATION/TRAINING PROGRAM

## 2025-02-26 PROCEDURE — 2709999900 CT BIOPSY LUNG/MEDIASTINUM PERC

## 2025-02-26 PROCEDURE — 0W9930Z DRAINAGE OF RIGHT PLEURAL CAVITY WITH DRAINAGE DEVICE, PERCUTANEOUS APPROACH: ICD-10-PCS | Performed by: STUDENT IN AN ORGANIZED HEALTH CARE EDUCATION/TRAINING PROGRAM

## 2025-02-26 PROCEDURE — 6360000002 HC RX W HCPCS: Performed by: STUDENT IN AN ORGANIZED HEALTH CARE EDUCATION/TRAINING PROGRAM

## 2025-02-26 PROCEDURE — 88342 IMHCHEM/IMCYTCHM 1ST ANTB: CPT

## 2025-02-26 PROCEDURE — 6370000000 HC RX 637 (ALT 250 FOR IP): Performed by: HOSPITALIST

## 2025-02-26 PROCEDURE — C1729 CATH, DRAINAGE: HCPCS

## 2025-02-26 PROCEDURE — 88341 IMHCHEM/IMCYTCHM EA ADD ANTB: CPT

## 2025-02-26 PROCEDURE — 94761 N-INVAS EAR/PLS OXIMETRY MLT: CPT

## 2025-02-26 PROCEDURE — 32551 INSERTION OF CHEST TUBE: CPT

## 2025-02-26 PROCEDURE — 1100000000 HC RM PRIVATE

## 2025-02-26 PROCEDURE — 2700000000 HC OXYGEN THERAPY PER DAY

## 2025-02-26 RX ORDER — FENTANYL CITRATE 50 UG/ML
INJECTION, SOLUTION INTRAMUSCULAR; INTRAVENOUS PRN
Status: COMPLETED | OUTPATIENT
Start: 2025-02-26 | End: 2025-02-26

## 2025-02-26 RX ORDER — MIDAZOLAM HYDROCHLORIDE 2 MG/2ML
INJECTION, SOLUTION INTRAMUSCULAR; INTRAVENOUS PRN
Status: COMPLETED | OUTPATIENT
Start: 2025-02-26 | End: 2025-02-26

## 2025-02-26 RX ADMIN — MIDAZOLAM HYDROCHLORIDE 1 MG: 1 INJECTION, SOLUTION INTRAMUSCULAR; INTRAVENOUS at 12:37

## 2025-02-26 RX ADMIN — TAMSULOSIN HYDROCHLORIDE 0.4 MG: 0.4 CAPSULE ORAL at 08:34

## 2025-02-26 RX ADMIN — SODIUM CHLORIDE, PRESERVATIVE FREE 10 ML: 5 INJECTION INTRAVENOUS at 22:30

## 2025-02-26 RX ADMIN — ACETAMINOPHEN 650 MG: 325 TABLET ORAL at 22:28

## 2025-02-26 RX ADMIN — FENTANYL CITRATE 50 MCG: 50 INJECTION INTRAMUSCULAR; INTRAVENOUS at 12:38

## 2025-02-26 RX ADMIN — AMLODIPINE BESYLATE 10 MG: 5 TABLET ORAL at 08:34

## 2025-02-26 RX ADMIN — PREDNISONE 40 MG: 20 TABLET ORAL at 08:34

## 2025-02-26 RX ADMIN — IPRATROPIUM BROMIDE AND ALBUTEROL SULFATE 1 DOSE: 2.5; .5 SOLUTION RESPIRATORY (INHALATION) at 21:37

## 2025-02-26 RX ADMIN — SODIUM CHLORIDE, PRESERVATIVE FREE 10 ML: 5 INJECTION INTRAVENOUS at 08:34

## 2025-02-26 RX ADMIN — DOXYCYCLINE HYCLATE 100 MG: 100 CAPSULE ORAL at 08:34

## 2025-02-26 RX ADMIN — FENTANYL CITRATE 50 MCG: 50 INJECTION INTRAMUSCULAR; INTRAVENOUS at 12:46

## 2025-02-26 RX ADMIN — IPRATROPIUM BROMIDE AND ALBUTEROL SULFATE 1 DOSE: 2.5; .5 SOLUTION RESPIRATORY (INHALATION) at 09:38

## 2025-02-26 RX ADMIN — MIDAZOLAM HYDROCHLORIDE 1 MG: 1 INJECTION, SOLUTION INTRAMUSCULAR; INTRAVENOUS at 12:46

## 2025-02-26 RX ADMIN — DOXYCYCLINE HYCLATE 100 MG: 100 CAPSULE ORAL at 22:28

## 2025-02-26 ASSESSMENT — PAIN DESCRIPTION - ORIENTATION: ORIENTATION: RIGHT;UPPER

## 2025-02-26 ASSESSMENT — PAIN SCALES - GENERAL
PAINLEVEL_OUTOF10: 0
PAINLEVEL_OUTOF10: 8

## 2025-02-26 ASSESSMENT — PAIN - FUNCTIONAL ASSESSMENT: PAIN_FUNCTIONAL_ASSESSMENT: PREVENTS OR INTERFERES SOME ACTIVE ACTIVITIES AND ADLS

## 2025-02-26 ASSESSMENT — PAIN DESCRIPTION - LOCATION: LOCATION: CHEST

## 2025-02-26 ASSESSMENT — PAIN DESCRIPTION - DESCRIPTORS: DESCRIPTORS: THROBBING

## 2025-02-26 NOTE — PLAN OF CARE
Problem: Discharge Planning  Goal: Discharge to home or other facility with appropriate resources  Outcome: Progressing     Problem: Pain  Goal: Verbalizes/displays adequate comfort level or baseline comfort level  Outcome: Progressing     Problem: ABCDS Injury Assessment  Goal: Absence of physical injury  Outcome: Progressing     Problem: Respiratory - Adult  Goal: Achieves optimal ventilation and oxygenation  Outcome: Progressing     Problem: Skin/Tissue Integrity - Adult  Goal: Skin integrity remains intact  Outcome: Progressing  Goal: Incisions, wounds, or drain sites healing without S/S of infection  Outcome: Progressing  Goal: Oral mucous membranes remain intact  Outcome: Progressing

## 2025-02-26 NOTE — PROGRESS NOTES
Hematology/Oncology   Progress Note    Patient: Donny Delgadillo MRN: [id number]     YOB: 1940  Age: 84 y.o.  Sex: male      Admit Date: 2/21/2025    LOS: 3 days     Reason for Consult:  lung mass found on recent CT scan    Subjective:     CT-guided lung biopsy has been rescheduled for today. Patient was to be discharge today after biopsy although he developed a small pneumothorax resulting in chest tube placement    Objective:     Vitals:    02/26/25 0239 02/26/25 0719 02/26/25 0742 02/26/25 0938   BP: 134/89  (!) 142/88    Pulse: 83 80 86 (!) 102   Resp: 18  20 18   Temp: 98.1 °F (36.7 °C)  97.9 °F (36.6 °C)    TempSrc: Oral  Oral    SpO2: 100%  98% 97%   Weight:       Height:          Physical Exam:  Constitutional: -American male,not in any acute distress or pain.  Eyes: Sclerae anicteric. Conjunctivae no pallor.  ENMT: Oral mucosa is moist, no thrush, mucositis, or petechiae.  Respiratory: Lungs are clear bilaterally.  Decreased breath sounds  Cardiovascular: Normal sinus rhythm  Abdomen: Soft, nontender, no hepatosplenomegaly. No guarding or rigidity. Bowel sounds present.  Extremities: No edema  Skin: No petechiae; no skin rash.  Neurologic: Alert/oriented    Lab/Data Review:  Recent Labs     02/24/25  0539 02/25/25  0751   WBC 8.5 8.6   HGB 11.4* 12.0*   HCT 36.0* 37.5    225     Recent Labs     02/24/25  0539 02/25/25  0751    139   K 4.0 4.1    106   CO2 28 30   BUN 21* 19   MG 2.0 1.9   PHOS 3.9 3.3     No results for input(s): \"PH\", \"PCO2\", \"PO2\", \"HCO3\", \"FIO2\" in the last 72 hours.  No results found for this or any previous visit (from the past 24 hour(s)).     Assessment and Plan:     Large lung mass   -Measuring 9 x 4.3 cm on CTA chest along with mediastinal hilar lymphadenopathy and moderate right pleural effusion.  The presentation of this mass and his history suggest this might be a different primary like a primary lung cancer.  -Agree with thoracentesis  and see if the cytology gives us the pathological diagnosis otherwise a CT-guided biopsy of the lung mass is needed.    -MRI of the cervical thoracic and lumbar spines for met evaluation; report reviewed, will get PET scan for further assessment prior to Rad Onc involvement   -Outpatient PET/CT scan  -IR completed lung biopsy 2/26; developed a small pneumothorax resulting in chest tube placement    Right pleural effusion  -Highly concerning for malignant effusion given lung mass.  -IR consulted for right-sided thoracentesis but was unable to complete as there is no safe window for thoracentesis    History of bladder cancer   -s/p TURBT and BCG  -Now reports having  recurrence  with bladder mass and was scheduled for biopsy followed by [facility]  -CT during this admission is also suggestive of recurrence.    Inpatient workup complete after completion of lung biopsy.  Follow-up with Dr. Tillman as outpatient in 1-2 weeks for biopsy results and care plan discussion     This dictation was generated by voice recognition computer software. Although all attempts are made to edit the dictation for accuracy, there may be errors in the transcription that are not intended.     Signed By: Adrienne Martinez, BASILIA - CNP     February 26, 2025

## 2025-02-26 NOTE — CARE COORDINATION
Patient has been accepted with [facility] when medically stable for discharge.

## 2025-02-26 NOTE — DISCHARGE SUMMARY
Physician Discharge Summary     Patient ID:    Donny Delgadillo  [id number]  84 y.o.  1940    Admit date: 2/21/2025    Discharge date : 2/26/2025      Final Diagnoses:     Lung mass with pleural effusion  Acute hypoxic respiratory failure  COPD, acute exacerbation  Normocytic Anemia  BPH with urinary obstruction  Malignant neoplasm of urinary bladder  Essential hypertension    Reason for Hospitalization:    Donny Delgadillo is a 84 y.o. male presents with hypoxia.  He states that 3 weeks ago he had a cystoscopy diagnosing bladder cancer by his urologist.  He has had some right rib pain posterior lateral ribs and right lateral ribs and right upper flank since then shortness of breath on exertion.  Denies chest pain cough fevers.  Went to his urologist today for follow-up procedure to assess the extent of cancer minutes he was hypoxic and sent into the ER.      In the ED, CBC was unremarkable. Albumin 3.0. Magnesium and Troponin were unremarkable. BNP elevated to 693. Urine analysis showed hematuria. CXR showed right upper lobe mass concerning for neoplasm and small right pleural effusion. CT of the abdomen/pelvis showed \"bilateral cortical cysts, nonobstructing left intrarenal calculus.\"      Hospital Course:     On the first day on the medical floor (2/23/2025), pt denied chest pain, shortness of breath, HA, abdominal pain, and urinary symptoms. Pt was on room air. Oncology, urology, and pulmonology were following. Echo revealed EF 65-70%, mildly increased wall thickness, concentric remodeling, and tricuspid regurgitation. MRI revealed foraminal narrowing at C3-C4 through C7-T1, L3-L4, and L5-S1.      On the next day, pt was seen lying comfortably awake and alert. Pt was currently on room air. Thoracentesis was unable to be performed by IR, so CT-guided biopsy was scheduled for the next day. Blood cultures on 2/21 returned with gram positive cocci x1, but bio fire did not detect staph aureus.  hours.    Invalid input(s): \"GPT\", \"PROT\"  No results for input(s): \"INR\", \"PROTIME\", \"APTT\" in the last 72 hours.   No results for input(s): \"IRON\", \"TIBC\" in the last 72 hours.    Invalid input(s): \"PSAT\", \"FERR\"   No results for input(s): \"PH\", \"PCO2\", \"PO2\" in the last 72 hours.  No results for input(s): \"CKTOTAL\", \"CKMB\", \"TROPONINI\" in the last 72 hours.  No results found for: \"POCGLU\"      Discharge time spent 35 minutes    Signed:  Flakita Brandt  2/26/2025  12:50 PM

## 2025-02-26 NOTE — PROGRESS NOTES
Hospitalist Progress Note               Daily Progress Note: 2/26/2025      Hospital Day: 6     Chief complaint:   Chief Complaint   Patient presents with    hypoxia        Subjective:   Hospital course to date:    Donny Delgadillo is a 84 y.o. male presents with hypoxia.  He states that 3 weeks ago he had a cystoscopy diagnosing bladder cancer by his urologist.  He has had some right rib pain posterior lateral ribs and right lateral ribs and right upper flank since then shortness of breath on exertion.  Denies chest pain cough fevers.  Went to his urologist today for follow-up procedure to assess the extent of cancer minutes he was hypoxic and sent into the ER.     In the ED, CBC was unremarkable. Albumin 3.0. Magnesium and Troponin were unremarkable. BNP elevated to 693. Urine analysis showed hematuria. CXR showed right upper lobe mass concerning for neoplasm and small right pleural effusion. CT of the abdomen/pelvis showed \"bilateral cortical cysts, nonobstructing left intrarenal calculus.\"     2/23/2025: Pt denied chest pain, shortness of breath, HA, abdominal pain, and urinary symptoms. Pt was on room air. Oncology, urology, and pulmonology were following. Echo revealed EF 65-70%, mildly increased wall thickness, concentric remodeling, and tricuspid regurgitation. MRI revealed foraminal narrowing at C3-C4 through C7-T1, L3-L4, and L5-S1.     2/24/2025: Pt was seen lying comfortably awake and alert. Pt was currently on room air. Thoracentesis was unable to be performed by IR, so CT-guided biopsy was scheduled for the next day. Blood cultures on 2/21 returned with gram positive cocci x1, but bio fire did not detect staph aureus. Vancomycin was held unless pt exhibited signs of infection. RBC decreased to 3.95 and Hemoglobin 11.4.     2/25/2025: Pt was seen lying comfortably in bed awake and alert. MRSA undetected. Pt denied HA, shortness of breath, dizziness, chest pain, and N/V.    RBC improving to 4.06.  SPACE   Final Result   Insufficient pleural fluid for safe thoracentesis. Patient is scheduled for   CT-guided lung biopsy the next day, as he is not NPO today.            Electronically signed by KLAUS SANDOVAL      MRI LUMBAR SPINE W WO CONTRAST   Final Result   Cervical spine MRI demonstrates multilevel spondylosis with associated   high-grade neuroforaminal narrowing at C3-C4 through C7-T1 as detailed.   Nonspecific subcentimeter spinous process intraosseous lesion within the T1   spinous process is indeterminate. Continued attention on follow-up advised.      Thoracic spine MRI demonstrates no obvious spinal mass or enhancing lesion.   No significant spondylotic changes or cord compression.   Partially imaged right upper lobe lung mass and associated at least moderate   right pleural effusion.      Lumbar spine MRI demonstrates no obvious suspicious mass or enhancing lesion.   Chronic findings include mild scoliosis with up to 1.0 cm lateral vertebral   listhesis and multilevel spondylosis with high-grade neuroforaminal narrowing at   L3-L4 through L5-S1 as detailed.      Electronically signed by RAMONE RIOS      MRI CERVICAL SPINE W WO CONTRAST   Final Result   Cervical spine MRI demonstrates multilevel spondylosis with associated   high-grade neuroforaminal narrowing at C3-C4 through C7-T1 as detailed.   Nonspecific subcentimeter spinous process intraosseous lesion within the T1   spinous process is indeterminate. Continued attention on follow-up advised.      Thoracic spine MRI demonstrates no obvious spinal mass or enhancing lesion.   No significant spondylotic changes or cord compression.   Partially imaged right upper lobe lung mass and associated at least moderate   right pleural effusion.      Lumbar spine MRI demonstrates no obvious suspicious mass or enhancing lesion.   Chronic findings include mild scoliosis with up to 1.0 cm lateral vertebral   listhesis and multilevel spondylosis with high-grade

## 2025-02-26 NOTE — OR NURSING
Patient to IR for Lung biopsy  Patient identified, procedure verified  Consent obtained, site prepped  Procedure complete   Right lung biopsy complete  Moderate sedation administered, V/S stable, pain controlled  Post imaging obtained, small post biopsy pneumothorax noted,   8.5 Fr chest tube placed using CT, one suture placed, Stayfix, Petroleum dressing and Tegaderm applied to site  Connected to [place]/water seal  PULMONOLOGY to manage chest tube   Patient V/S stable  Patient Fully recovered, Transported back to nursing unit  Report called to Primary RNRubia

## 2025-02-26 NOTE — PROGRESS NOTES
Pulmonology Progress Note    Subjective:     Patient seen and examined in his room on the floor this afternoon, no acute events overnight.    An attempt was done with ultrasound-guided left thoracentesis but apparently they could not get a good window and fluid was small in amount.    Patient is currently on room air.  He will get his CT-guided biopsy for right upper lobe mass today  He will follow-up with Dr. Fallon after discharge from hospital      Current Facility-Administered Medications   Medication Dose Route Frequency Provider Last Rate Last Admin    ipratropium 0.5 mg-albuterol 2.5 mg (DUONEB) nebulizer solution 1 Dose  1 Dose Inhalation BID RT Juan Manuel Kelley MD   1 Dose at 02/26/25 0938    ipratropium 0.5 mg-albuterol 2.5 mg (DUONEB) nebulizer solution 1 Dose  1 Dose Inhalation Q4H PRN Juan Manuel Kelley MD        doxycycline hyclate (VIBRAMYCIN) capsule 100 mg  100 mg Oral 2 times per day Caden Fallon DO   100 mg at 02/26/25 0834    albuterol sulfate HFA (PROVENTIL;VENTOLIN;PROAIR) 108 (90 Base) MCG/ACT inhaler 2 puff  2 puff Inhalation Q6H PRN Juan Manuel Kelley MD        amLODIPine (NORVASC) tablet 10 mg  10 mg Oral Daily Dionicio Hathaway, DO   10 mg at 02/26/25 0834    tamsulosin (FLOMAX) capsule 0.4 mg  0.4 mg Oral Daily Dionicio Hathaway, DO   0.4 mg at 02/26/25 0834    sodium chloride flush 0.9 % injection 5-40 mL  5-40 mL IntraVENous 2 times per day Dionicio Hathaway, DO   10 mL at 02/26/25 0834    sodium chloride flush 0.9 % injection 5-40 mL  5-40 mL IntraVENous PRN Dionicio Hathaway, DO        0.9 % sodium chloride infusion   IntraVENous PRN Dionicio Hathaway, DO        potassium chloride (KLOR-CON M) extended release tablet 40 mEq  40 mEq Oral PRN Dionicio Hathaway, DO        Or    potassium bicarb-citric acid (EFFER-K) effervescent tablet 40 mEq  40 mEq Oral PRN Dionicio Hathaway, DO        Or    potassium chloride 10 mEq/100 mL IVPB (Peripheral Line)  10 mEq IntraVENous PRN Dionicio Hathaway, DO      right-sided pleural effusion, and mediastinal/hilar adenopathy.    -All of these findings are extremely concerning for underlying malignancy.    -IR was consulted for left thoracentesis, it was attempted but ultrasound did not reveal a whole lot of fluid and was poor window as well.    He is scheduled for CT-guided biopsy tomorrow for his left upper lobe lung mass today.    4.)  Right pleural effusion  -Thoracentesis attempted but not enough fluid was visualized on ultrasound  5.)  Bladder cancer  -Reportedly diagnosed via cystoscopy March 2024.  Status post BCG therapy.  -Oncology consulted    6.)  Ex-smoker  -Long history of smoking (1 pack/day for over 50 years), but patient has already quit, significant encouragement given.  -Strongly recommend outpatient pulmonary follow-up with PFTs      CODE STATUS: Full Code       Darrick Vogel MD  [facility] ([facility])  2/26/2025  10:05 AM

## 2025-02-26 NOTE — PLAN OF CARE
Problem: Discharge Planning  Goal: Discharge to home or other facility with appropriate resources  2/26/2025 0759 by Rubia Clayton RN  Outcome: Progressing  2/26/2025 0035 by Tiff Resendiz RN  Outcome: Progressing     Problem: Pain  Goal: Verbalizes/displays adequate comfort level or baseline comfort level  2/26/2025 0759 by Rubia Clayton RN  Outcome: Progressing  2/26/2025 0035 by Tiff Resendiz RN  Outcome: Progressing     Problem: ABCDS Injury Assessment  Goal: Absence of physical injury  2/26/2025 0759 by Rubia Clayton RN  Outcome: Progressing  2/26/2025 0035 by Tiff Resendiz RN  Outcome: Progressing     Problem: Respiratory - Adult  Goal: Achieves optimal ventilation and oxygenation  2/26/2025 0759 by Rubia Clayton RN  Outcome: Progressing  2/26/2025 0035 by Tfif Resendiz RN  Outcome: Progressing     Problem: Skin/Tissue Integrity - Adult  Goal: Skin integrity remains intact  2/26/2025 0759 by Rubia Clayton RN  Outcome: Progressing  2/26/2025 0035 by Tiff Resendiz RN  Outcome: Progressing  Goal: Incisions, wounds, or drain sites healing without S/S of infection  2/26/2025 0759 by Rubia Clayton RN  Outcome: Progressing  2/26/2025 0035 by Tiff Resendiz RN  Outcome: Progressing  Goal: Oral mucous membranes remain intact  2/26/2025 0759 by Rubia Clayton RN  Outcome: Progressing  2/26/2025 0035 by Tiff Resendiz RN  Outcome: Progressing

## 2025-02-26 NOTE — DISCHARGE SUMMARY
Physician Discharge Summary     Patient ID:    Donny Delgadillo  [id number]  84 y.o.  1940    Admit date: 2/21/2025    Discharge date : 2/26/2025      Final Diagnoses:     Lung mass with pleural effusion  Acute hypoxic respiratory failure  COPD, acute exacerbation  Normocytic Anemia  BPH with urinary obstruction  Malignant neoplasm of urinary bladder  Essential hypertension    Reason for Hospitalization:    Donny Delgadillo is a 84 y.o. male presents with hypoxia.  He states that 3 weeks ago he had a cystoscopy diagnosing bladder cancer by his urologist.  He has had some right rib pain posterior lateral ribs and right lateral ribs and right upper flank since then shortness of breath on exertion.  Denies chest pain cough fevers.  Went to his urologist today for follow-up procedure to assess the extent of cancer minutes he was hypoxic and sent into the ER.      In the ED, CBC was unremarkable. Albumin 3.0. Magnesium and Troponin were unremarkable. BNP elevated to 693. Urine analysis showed hematuria. CXR showed right upper lobe mass concerning for neoplasm and small right pleural effusion. CT of the abdomen/pelvis showed \"bilateral cortical cysts, nonobstructing left intrarenal calculus.\"      Hospital Course:     On the first day on the medical floor (2/23/2025), pt denied chest pain, shortness of breath, HA, abdominal pain, and urinary symptoms. Pt was on room air. Oncology, urology, and pulmonology were following. Echo revealed EF 65-70%, mildly increased wall thickness, concentric remodeling, and tricuspid regurgitation. MRI revealed foraminal narrowing at C3-C4 through C7-T1, L3-L4, and L5-S1.      On the next day, pt was seen lying comfortably awake and alert. Pt was currently on room air. Thoracentesis was unable to be performed by IR, so CT-guided biopsy was scheduled for the next day. Blood cultures on 2/21 returned with gram positive cocci x1, but bio fire did not detect staph aureus.  results for input(s): \"IRON\", \"TIBC\" in the last 72 hours.    Invalid input(s): \"PSAT\", \"FERR\"   No results for input(s): \"PH\", \"PCO2\", \"PO2\" in the last 72 hours.  No results for input(s): \"CKTOTAL\", \"CKMB\", \"TROPONINI\" in the last 72 hours.  No results found for: \"POCGLU\"      Discharge time spent 35 minutes    Signed:  Zbigniew Nielson MD  2/26/2025  12:54 PM

## 2025-02-27 ENCOUNTER — APPOINTMENT (OUTPATIENT)
Facility: HOSPITAL | Age: 85
DRG: 180 | End: 2025-02-27
Payer: MEDICARE

## 2025-02-27 PROCEDURE — 6370000000 HC RX 637 (ALT 250 FOR IP): Performed by: HOSPITALIST

## 2025-02-27 PROCEDURE — 2500000003 HC RX 250 WO HCPCS: Performed by: HOSPITALIST

## 2025-02-27 PROCEDURE — 1100000000 HC RM PRIVATE

## 2025-02-27 PROCEDURE — 94640 AIRWAY INHALATION TREATMENT: CPT

## 2025-02-27 PROCEDURE — 6370000000 HC RX 637 (ALT 250 FOR IP): Performed by: INTERNAL MEDICINE

## 2025-02-27 PROCEDURE — 94761 N-INVAS EAR/PLS OXIMETRY MLT: CPT

## 2025-02-27 PROCEDURE — 71045 X-RAY EXAM CHEST 1 VIEW: CPT

## 2025-02-27 RX ADMIN — SODIUM CHLORIDE, PRESERVATIVE FREE 10 ML: 5 INJECTION INTRAVENOUS at 21:37

## 2025-02-27 RX ADMIN — ACETAMINOPHEN 650 MG: 325 TABLET ORAL at 21:36

## 2025-02-27 RX ADMIN — ACETAMINOPHEN 650 MG: 325 TABLET ORAL at 09:12

## 2025-02-27 RX ADMIN — AMLODIPINE BESYLATE 10 MG: 5 TABLET ORAL at 09:04

## 2025-02-27 RX ADMIN — IPRATROPIUM BROMIDE AND ALBUTEROL SULFATE 1 DOSE: 2.5; .5 SOLUTION RESPIRATORY (INHALATION) at 09:17

## 2025-02-27 RX ADMIN — IPRATROPIUM BROMIDE AND ALBUTEROL SULFATE 1 DOSE: 2.5; .5 SOLUTION RESPIRATORY (INHALATION) at 18:10

## 2025-02-27 RX ADMIN — SODIUM CHLORIDE, PRESERVATIVE FREE 10 ML: 5 INJECTION INTRAVENOUS at 09:05

## 2025-02-27 RX ADMIN — TAMSULOSIN HYDROCHLORIDE 0.4 MG: 0.4 CAPSULE ORAL at 09:04

## 2025-02-27 RX ADMIN — DOXYCYCLINE HYCLATE 100 MG: 100 CAPSULE ORAL at 21:36

## 2025-02-27 RX ADMIN — DOXYCYCLINE HYCLATE 100 MG: 100 CAPSULE ORAL at 09:04

## 2025-02-27 ASSESSMENT — PAIN SCALES - GENERAL
PAINLEVEL_OUTOF10: 3
PAINLEVEL_OUTOF10: 2
PAINLEVEL_OUTOF10: 5
PAINLEVEL_OUTOF10: 3

## 2025-02-27 ASSESSMENT — PAIN DESCRIPTION - LOCATION
LOCATION: CHEST
LOCATION: CHEST

## 2025-02-27 ASSESSMENT — PAIN DESCRIPTION - ORIENTATION: ORIENTATION: RIGHT

## 2025-02-27 ASSESSMENT — PAIN DESCRIPTION - DESCRIPTORS: DESCRIPTORS: ACHING

## 2025-02-27 NOTE — PROGRESS NOTES
Hematology/Oncology   Progress Note    Patient: Donny Delgadillo MRN: [id number]     YOB: 1940  Age: 84 y.o.  Sex: male      Admit Date: 2/21/2025    LOS: 4 days     Reason for Consult:  lung mass found on recent CT scan    Subjective:     Patient was to be discharge yesterday after biopsy although he developed a small pneumothorax resulting in chest tube placement    Objective:     Vitals:    02/27/25 0032 02/27/25 0214 02/27/25 0847 02/27/25 0918   BP:  (!) 143/91 (!) 132/96    Pulse: 80 85 (!) 104 99   Resp:  18 20 15   Temp:  98.2 °F (36.8 °C) 98.6 °F (37 °C)    TempSrc:  Oral Oral    SpO2:  99% 97% 97%   Weight:       Height:          Physical Exam:  Constitutional: -American male,not in any acute distress or pain.  Eyes: Sclerae anicteric. Conjunctivae no pallor.  ENMT: Oral mucosa is moist, no thrush, mucositis, or petechiae.  Respiratory: Lungs are clear bilaterally. Decreased breath sounds  Cardiovascular: Normal sinus rhythm  Abdomen: Soft, nontender. No guarding or rigidity. Bowel sounds present.  Extremities: No edema  Skin: No petechiae; no skin rash.  Neurologic: Alert/oriented    Lab/Data Review:  Recent Labs     02/25/25  0751   WBC 8.6   HGB 12.0*   HCT 37.5        Recent Labs     02/25/25  0751      K 4.1      CO2 30   BUN 19   MG 1.9   PHOS 3.3     No results for input(s): \"PH\", \"PCO2\", \"PO2\", \"HCO3\", \"FIO2\" in the last 72 hours.  No results found for this or any previous visit (from the past 24 hour(s)).     Assessment and Plan:     Large lung mass   -Measuring 9 x 4.3 cm on CTA chest along with mediastinal hilar lymphadenopathy and moderate right pleural effusion. The presentation of this mass and his history suggest this might be a different primary like a primary lung cancer.  -Agree with thoracentesis and see if the cytology gives us the pathological diagnosis otherwise a CT-guided biopsy of the lung mass is needed.    -MRI of the cervical thoracic  and lumbar spines for met evaluation; report reviewed, will get PET scan for further assessment prior to Rad Onc involvement   -Outpatient PET/CT scan  -IR completed lung biopsy 2/26; developed a small pneumothorax resulting in chest tube placement    Right pleural effusion  -Highly concerning for malignant effusion given lung mass.  -IR consulted for right-sided thoracentesis but was unable to complete as there is no safe window for thoracentesis    History of bladder cancer   -s/p TURBT and BCG  -Now reports having  recurrence  with bladder mass and was scheduled for biopsy followed by [facility]  -CT during this admission is also suggestive of recurrence.    Inpatient workup complete after completion of lung biopsy.  Follow-up with Dr. Tillman as outpatient in 1-2 weeks for biopsy results and care plan discussion     We will sign off at this time, feel free to reach out any questions or concerns    This dictation was generated by voice recognition computer software. Although all attempts are made to edit the dictation for accuracy, there may be errors in the transcription that are not intended.     Signed By: Adrienne Martinez, BASILIA - CNP     February 27, 2025

## 2025-02-27 NOTE — PROGRESS NOTES
Hospitalist Progress Note               Daily Progress Note: 2/27/2025      Hospital Day: 7     Chief complaint:   Chief Complaint   Patient presents with    hypoxia        Subjective:   Hospital course to date:    Donny Delgadillo is a 84 y.o. male presents with hypoxia.  He states that 3 weeks ago he had a cystoscopy diagnosing bladder cancer by his urologist.  He has had some right rib pain posterior lateral ribs and right lateral ribs and right upper flank since then shortness of breath on exertion.  Denies chest pain cough fevers.  Went to his urologist today for follow-up procedure to assess the extent of cancer minutes he was hypoxic and sent into the ER.     In the ED, CBC was unremarkable. Albumin 3.0. Magnesium and Troponin were unremarkable. BNP elevated to 693. Urine analysis showed hematuria. CXR showed right upper lobe mass concerning for neoplasm and small right pleural effusion. CT of the abdomen/pelvis showed \"bilateral cortical cysts, nonobstructing left intrarenal calculus.\"     2/23/2025: Pt denied chest pain, shortness of breath, HA, abdominal pain, and urinary symptoms. Pt was on room air. Oncology, urology, and pulmonology were following. Echo revealed EF 65-70%, mildly increased wall thickness, concentric remodeling, and tricuspid regurgitation. MRI revealed foraminal narrowing at C3-C4 through C7-T1, L3-L4, and L5-S1.     2/24/2025: Pt was seen lying comfortably awake and alert. Pt was currently on room air. Thoracentesis was unable to be performed by IR, so CT-guided biopsy was scheduled for the next day. Blood cultures on 2/21 returned with gram positive cocci x1, but bio fire did not detect staph aureus. Vancomycin was held unless pt exhibited signs of infection. RBC decreased to 3.95 and Hemoglobin 11.4.     He underwent CT-guided lung biopsy on 2/26 but developed a small pneumothorax and pigtail catheter was subsequently placed.  --------  Patient is seen today for follow-up.  He is  of the case with:    [] Telemetry personally reviewed and interpreted as documented above    [] Imaging personally reviewed and interpreted, includes:    [] Data Review (any 3)  [] All available Consultant notes from yesterday/today were reviewed  [] All current labs were reviewed and interpreted for clinical significance   [] Appropriate follow-up labs were ordered  [] Collateral history obtained from:               Assessment:    Lung mass with pleural effusion  Pneumothorax following lung biopsy  - IR performed CT-guided lung biopsy 2/26, subsequently required pigtail catheter placement due to pneumothorax  -MRI to rule out metastasis displayed foraminal narrowing at C3-C4 through C7-T1, L3-L4, and L5-S1  -Large RUL mass w/ mediastinal/hilar lymphadenopathy & secondary moderate right pleural effusion noted on CT chest       Acute hypoxic respiratory failure, resolved  - Underlying Pleural effusions the suspected etiology with underlying malignancy the suspected etiology  -CXR: Large oval opacity in the right upper lung concerning for neoplasm, small right pleural effusion and bilateral lower lobe parenchymal scarring   -CTA chest: 9 x 4.3 cm right upper lobe mass, severe emphysema, moderate right pleural effusion, mediastinal hilar lymphadenopathy, pulmonary hypertension       Bacteremia due to Micrococcus luteus, likely contaminant    COPD, acute exacerbation  -on duonebs, doxycycline, and prednisone    Normocytic Anemia  -awaiting stool occult test        BPH with urinary obstruction  - Chronic presentation w/o evidence of acute obstruction on CT A/P  - on tamsulosin     Malignant neoplasm of urinary bladder   - CT abdomen/pelvis: Asymmetrical left bladder wall thickening, nonobstructing left intrarenal calculus   - pt will follow up with outpatient urology    Essential hypertension  - on amlodipine    Plan:  Continue pigtail catheter to wall suction  Repeat chest x-ray      Code status: full code    Social

## 2025-02-27 NOTE — CARE COORDINATION
Patient has been accepted with [facility].  However, If patient needs pleural drain upon discharge, [facility] will not be able to manage this and patient may need another home health company.

## 2025-02-27 NOTE — PROGRESS NOTES
Pulmonology Progress Note    Subjective:     Patient seen and examined in his room on the floor this am.  Patient underwent CT-guided biopsy of right upper lobe mass successfully, postprocedure he was noted to have a small right apical pneumothorax.    IR placed pigtail catheter under CT scan guidance   Pleural catheter is connected to negative pressure with waterseal.  This a.m. no air leak was noted.  Will get chest x-ray to further evaluate extent of pneumothorax and resolution of air  Patient had mild discomfort last evening which improved after was given couple of Tylenol's.  Current Facility-Administered Medications   Medication Dose Route Frequency Provider Last Rate Last Admin    ipratropium 0.5 mg-albuterol 2.5 mg (DUONEB) nebulizer solution 1 Dose  1 Dose Inhalation BID RT Juan Manuel Kelley MD   1 Dose at 02/27/25 0917    ipratropium 0.5 mg-albuterol 2.5 mg (DUONEB) nebulizer solution 1 Dose  1 Dose Inhalation Q4H PRN Juan Manuel Kelley MD        doxycycline hyclate (VIBRAMYCIN) capsule 100 mg  100 mg Oral 2 times per day Caden Fallon DO   100 mg at 02/27/25 0904    albuterol sulfate HFA (PROVENTIL;VENTOLIN;PROAIR) 108 (90 Base) MCG/ACT inhaler 2 puff  2 puff Inhalation Q6H PRN Juan Manuel Kelley MD        amLODIPine (NORVASC) tablet 10 mg  10 mg Oral Daily Dionicio Hathaway, DO   10 mg at 02/27/25 0904    tamsulosin (FLOMAX) capsule 0.4 mg  0.4 mg Oral Daily Dionicio Hathaway, DO   0.4 mg at 02/27/25 0904    sodium chloride flush 0.9 % injection 5-40 mL  5-40 mL IntraVENous 2 times per day Dionicio Hathaway, DO   10 mL at 02/27/25 0905    sodium chloride flush 0.9 % injection 5-40 mL  5-40 mL IntraVENous PRN Dionicio Hathaway, DO        0.9 % sodium chloride infusion   IntraVENous PRN Dionicio Hathaway, DO        potassium chloride (KLOR-CON M) extended release tablet 40 mEq  40 mEq Oral PRN Dionicio Hathaway,         Or    potassium bicarb-citric acid (EFFER-K) effervescent tablet 40 mEq  40 mEq Oral PRN Rivas  regular rate and rhythm, S1, S2 normal, no murmur, click, rub or gallop  Abdomen: soft, non-tender; bowel sounds normal; no masses,  no organomegaly  Extremities: extremities normal, atraumatic, no cyanosis or edema  Pulses: 2+ and symmetric  Skin: Skin color, texture, turgor normal. No rashes or lesions  Lymph nodes: Cervical, supraclavicular, and axillary nodes normal.  Neurologic: Grossly normal, alert and oriented x 3, no focal neurologic abnormalities    Additional comments:None    Lab/Data Review:    No results found for this or any previous visit (from the past 24 hour(s)).      CTA chest/abdomen/pelvis (2/21/2025):  FINDINGS:     CHEST: This is a good quality study for the evaluation of pulmonary embolism to  the first subsegmental arterial level. There is no pulmonary embolism to this  level. Main pulmonary outflow tract 3.6 cm, enlarged.  MEDIASTINUM: Subcarinal lymph node 1.2 cm short axis diameter paraesophageal  lymph node 1.1 cm short axis diameter  JASBIR: Right hilar lymphadenopathy or adjacent pleural-parenchymal thickening.  1.6 cm short axis diameter, with a second similar soft tissue thickening 1.9 cm  short axis diameter.  THORACIC AORTA: No aneurysm.  HEART: Normal in size.  ESOPHAGUS: No wall thickening or dilatation.  TRACHEA/BRONCHI: Patent.  PLEURA: Moderately large right pleural effusion. Nodular pleural thickening  right upper lobe anteriorly and right lower lobe medially.  LUNGS: Right upper lobe mass 9.0 x 4.3 cm, correlating with the earlier chest  radiograph. Surrounding atelectasis, scar and emphysematous change, severe in  both the upper and lower lung fields..  BONES: No aggressive bone lesion or fracture.     ABDOMEN/PELVIS: No focal hepatic mass. The gallbladder is unremarkable. No  biliary ductal dilatation. The spleen and pancreas are unremarkable. The adrenal  glands are normal. Bilateral renal cortical and renal sinus cysts. Moderate  sized nonobstructing left intrarenal

## 2025-02-28 ENCOUNTER — APPOINTMENT (OUTPATIENT)
Facility: HOSPITAL | Age: 85
DRG: 180 | End: 2025-02-28
Payer: MEDICARE

## 2025-02-28 PROCEDURE — 94761 N-INVAS EAR/PLS OXIMETRY MLT: CPT

## 2025-02-28 PROCEDURE — 6370000000 HC RX 637 (ALT 250 FOR IP): Performed by: INTERNAL MEDICINE

## 2025-02-28 PROCEDURE — 1100000000 HC RM PRIVATE

## 2025-02-28 PROCEDURE — 94640 AIRWAY INHALATION TREATMENT: CPT

## 2025-02-28 PROCEDURE — 71045 X-RAY EXAM CHEST 1 VIEW: CPT

## 2025-02-28 PROCEDURE — 2500000003 HC RX 250 WO HCPCS: Performed by: HOSPITALIST

## 2025-02-28 PROCEDURE — 6370000000 HC RX 637 (ALT 250 FOR IP): Performed by: HOSPITALIST

## 2025-02-28 RX ADMIN — ACETAMINOPHEN 650 MG: 325 TABLET ORAL at 19:46

## 2025-02-28 RX ADMIN — TAMSULOSIN HYDROCHLORIDE 0.4 MG: 0.4 CAPSULE ORAL at 09:00

## 2025-02-28 RX ADMIN — SODIUM CHLORIDE, PRESERVATIVE FREE 10 ML: 5 INJECTION INTRAVENOUS at 21:23

## 2025-02-28 RX ADMIN — DOXYCYCLINE HYCLATE 100 MG: 100 CAPSULE ORAL at 09:00

## 2025-02-28 RX ADMIN — IPRATROPIUM BROMIDE AND ALBUTEROL SULFATE 1 DOSE: 2.5; .5 SOLUTION RESPIRATORY (INHALATION) at 07:44

## 2025-02-28 RX ADMIN — AMLODIPINE BESYLATE 10 MG: 5 TABLET ORAL at 09:00

## 2025-02-28 RX ADMIN — DOXYCYCLINE HYCLATE 100 MG: 100 CAPSULE ORAL at 20:03

## 2025-02-28 RX ADMIN — IPRATROPIUM BROMIDE AND ALBUTEROL SULFATE 1 DOSE: 2.5; .5 SOLUTION RESPIRATORY (INHALATION) at 21:43

## 2025-02-28 RX ADMIN — SODIUM CHLORIDE, PRESERVATIVE FREE 10 ML: 5 INJECTION INTRAVENOUS at 09:00

## 2025-02-28 ASSESSMENT — PAIN SCALES - GENERAL
PAINLEVEL_OUTOF10: 2
PAINLEVEL_OUTOF10: 3

## 2025-02-28 ASSESSMENT — PAIN DESCRIPTION - DESCRIPTORS: DESCRIPTORS: ACHING

## 2025-02-28 ASSESSMENT — PAIN DESCRIPTION - LOCATION: LOCATION: CHEST

## 2025-02-28 ASSESSMENT — PAIN DESCRIPTION - ORIENTATION: ORIENTATION: RIGHT

## 2025-02-28 NOTE — PROGRESS NOTES
guided right chest tube insertion.      Electronically signed by Marlen Almodovar      CT BIOPSY LUNG/MEDIASTINUM PERC   Final Result   Technically successful CT guided right lung mass biopsy.  Pathology results are   pending.      Electronically signed by Marlen Almodovar      IR  CHEST PLEURAL SPACE   Final Result   Insufficient pleural fluid for safe thoracentesis. Patient is scheduled for   CT-guided lung biopsy the next day, as he is not NPO today.            Electronically signed by KLAUS SANDOVAL      MRI LUMBAR SPINE W WO CONTRAST   Final Result   Cervical spine MRI demonstrates multilevel spondylosis with associated   high-grade neuroforaminal narrowing at C3-C4 through C7-T1 as detailed.   Nonspecific subcentimeter spinous process intraosseous lesion within the T1   spinous process is indeterminate. Continued attention on follow-up advised.      Thoracic spine MRI demonstrates no obvious spinal mass or enhancing lesion.   No significant spondylotic changes or cord compression.   Partially imaged right upper lobe lung mass and associated at least moderate   right pleural effusion.      Lumbar spine MRI demonstrates no obvious suspicious mass or enhancing lesion.   Chronic findings include mild scoliosis with up to 1.0 cm lateral vertebral   listhesis and multilevel spondylosis with high-grade neuroforaminal narrowing at   L3-L4 through L5-S1 as detailed.      Electronically signed by RAMONE RIOS      MRI CERVICAL SPINE W WO CONTRAST   Final Result   Cervical spine MRI demonstrates multilevel spondylosis with associated   high-grade neuroforaminal narrowing at C3-C4 through C7-T1 as detailed.   Nonspecific subcentimeter spinous process intraosseous lesion within the T1   spinous process is indeterminate. Continued attention on follow-up advised.      Thoracic spine MRI demonstrates no obvious spinal mass or enhancing lesion.   No significant spondylotic changes or cord compression.   Partially imaged right  findings described   above.         Electronically signed by AMANDA HOLLINGSWORTH      CT ABDOMEN PELVIS W IV CONTRAST Additional Contrast? None   Final Result   1.CHEST: Large right upper lobe mass with surrounding parenchymal and pleural   nodularity, consistent with primary or secondary neoplasm. Severe emphysematous   change. Moderate right pleural effusion. Mediastinal and hilar lymphadenopathy.   Enlarged main pulmonary outflow tract, correlate for pulmonary hypertension. No   pulmonary embolus.   2. ABDOMEN/PELVIS: Bilateral renal cortical cysts, nonobstructing left   intrarenal calculus, asymmetric left bladder wall thickening which may correlate   with recently diagnosed bladder cancer. Other incidental findings described   above.         Electronically signed by AMANDA HOLLINGSWORTH      CT HEAD WO CONTRAST   Final Result   No acute intracranial hemorrhage, mass or infarct.          Electronically signed by Jackson Melgar MD      XR CHEST 1 VIEW   Final Result   Large oval opacity right upper lobe is concerning for neoplasm.   Correlation with nonemergent contrast-enhanced chest CT is recommended. Small   right pleural effusion and bilateral lower lobe pleuroparenchymal scarring   versus atelectasis.      Electronically signed by RITU SNOW             Discussion/MDM:     [] High (any 2)    A. Problems (any 1)  [] Acute/Chronic Illness/injury posing threat to life or bodily function:    [] Severe exacerbation of chronic illness:    ---------------------------------------------------------------------  B. Risk of Treatment (any 1)   [] Drugs/treatments that require intensive monitoring for toxicity include:    [] IV ABX requiring serial renal monitoring for nephrotoxicity:     [] IV Narcotic analgesia for adverse drug reaction  [] Aggressive IV diuresis requiring serial monitoring for renal impairment and electrolyte derangements  [] Critical electrolyte abnormalities requiring IV replacement and close serial

## 2025-02-28 NOTE — PLAN OF CARE
Problem: Discharge Planning  Goal: Discharge to home or other facility with appropriate resources  2/27/2025 2246 by Flakita Espinosa RN  Outcome: Progressing  2/27/2025 1305 by Tricia Martel RN  Outcome: Progressing     Problem: Pain  Goal: Verbalizes/displays adequate comfort level or baseline comfort level  2/27/2025 2246 by Flakita Espinosa RN  Outcome: Progressing  2/27/2025 1305 by Tricia Martel RN  Outcome: Progressing     Problem: ABCDS Injury Assessment  Goal: Absence of physical injury  Outcome: Progressing     Problem: Respiratory - Adult  Goal: Achieves optimal ventilation and oxygenation  Outcome: Progressing     Problem: Skin/Tissue Integrity - Adult  Goal: Skin integrity remains intact  Outcome: Progressing     Problem: Skin/Tissue Integrity - Adult  Goal: Incisions, wounds, or drain sites healing without S/S of infection  Outcome: Progressing     Problem: Skin/Tissue Integrity - Adult  Goal: Oral mucous membranes remain intact  Outcome: Progressing

## 2025-02-28 NOTE — PROGRESS NOTES
Pulmonology Progress Note    Subjective:     Patient seen and examined in his room on the floor this am.  Patient underwent CT-guided biopsy of right upper lobe mass successfully, postprocedure he was noted to have a small right apical pneumothorax.    IR placed pigtail catheter under CT scan guidance   Pleural catheter is connected to negative pressure with waterseal.  2/28/2025  Chest x-ray from yesterday was reviewed, has small residual pneumothorax.  This a.m. no air leak was noted.  Repeat chest x-ray is ordered   Patient denied any significant discomfort.    Discussed with son at bedside   Current Facility-Administered Medications   Medication Dose Route Frequency Provider Last Rate Last Admin    ipratropium 0.5 mg-albuterol 2.5 mg (DUONEB) nebulizer solution 1 Dose  1 Dose Inhalation BID RT Juan Manuel Kelley MD   1 Dose at 02/28/25 0744    ipratropium 0.5 mg-albuterol 2.5 mg (DUONEB) nebulizer solution 1 Dose  1 Dose Inhalation Q4H PRN Juan Manuel Kelley MD        doxycycline hyclate (VIBRAMYCIN) capsule 100 mg  100 mg Oral 2 times per day Caden Fallon DO   100 mg at 02/28/25 0900    albuterol sulfate HFA (PROVENTIL;VENTOLIN;PROAIR) 108 (90 Base) MCG/ACT inhaler 2 puff  2 puff Inhalation Q6H PRN Juan Manuel Kelley MD        amLODIPine (NORVASC) tablet 10 mg  10 mg Oral Daily Dionicio Hathaway, DO   10 mg at 02/28/25 0900    tamsulosin (FLOMAX) capsule 0.4 mg  0.4 mg Oral Daily Dionicio Hathaway, DO   0.4 mg at 02/28/25 0900    sodium chloride flush 0.9 % injection 5-40 mL  5-40 mL IntraVENous 2 times per day Dionicio Hathaway, DO   10 mL at 02/28/25 0900    sodium chloride flush 0.9 % injection 5-40 mL  5-40 mL IntraVENous PRN Dionicio Hathaway, DO        0.9 % sodium chloride infusion   IntraVENous PRN Dionicio Hathaway, DO        potassium chloride (KLOR-CON M) extended release tablet 40 mEq  40 mEq Oral PRN Dionicio Hathaway DO        Or    potassium bicarb-citric acid (EFFER-K) effervescent tablet 40 mEq  40 mEq  emphysema and bullae bilaterally, a small to moderate right-sided pleural effusion, and mediastinal/hilar adenopathy.    -All of these findings are extremely concerning for underlying malignancy.    Patient underwent CT-guided biopsy of right upper lobe mass successfully,    4.)  Right pneumothorax   postprocedure he was noted to have a small right apical pneumothorax.    IR placed pigtail catheter under CT scan guidance   Pleural catheter is connected to negative pressure with waterseal.  Chest x-ray from yesterday was reviewed which showed small residual pneumothorax  Repeat chest x-ray ordered for today  This a.m. no air leak was noted.    5.)  Right pleural effusion  -Thoracentesis attempted but not enough fluid was visualized on ultrasound  6.)  Bladder cancer  -Reportedly diagnosed via cystoscopy March 2024.  Status post BCG therapy.  -Oncology consulted    6.)  Ex-smoker  -Long history of smoking (1 pack/day for over 50 years), but patient has already quit, significant encouragement given.  -Strongly recommend outpatient pulmonary follow-up with PFTs      CODE STATUS: Full Code       Darrick Vogel MD  [facility] ([facility])  2/28/2025  9:15 AM

## 2025-03-01 ENCOUNTER — APPOINTMENT (OUTPATIENT)
Facility: HOSPITAL | Age: 85
DRG: 180 | End: 2025-03-01
Payer: MEDICARE

## 2025-03-01 VITALS
RESPIRATION RATE: 17 BRPM | SYSTOLIC BLOOD PRESSURE: 131 MMHG | BODY MASS INDEX: 19.76 KG/M2 | TEMPERATURE: 98.6 F | HEART RATE: 102 BPM | HEIGHT: 70 IN | OXYGEN SATURATION: 95 % | DIASTOLIC BLOOD PRESSURE: 88 MMHG | WEIGHT: 138 LBS

## 2025-03-01 PROCEDURE — 6370000000 HC RX 637 (ALT 250 FOR IP): Performed by: HOSPITALIST

## 2025-03-01 PROCEDURE — 71045 X-RAY EXAM CHEST 1 VIEW: CPT

## 2025-03-01 PROCEDURE — 6370000000 HC RX 637 (ALT 250 FOR IP): Performed by: INTERNAL MEDICINE

## 2025-03-01 PROCEDURE — 2500000003 HC RX 250 WO HCPCS: Performed by: HOSPITALIST

## 2025-03-01 PROCEDURE — 97116 GAIT TRAINING THERAPY: CPT

## 2025-03-01 PROCEDURE — 94640 AIRWAY INHALATION TREATMENT: CPT

## 2025-03-01 RX ADMIN — AMLODIPINE BESYLATE 10 MG: 5 TABLET ORAL at 08:31

## 2025-03-01 RX ADMIN — TAMSULOSIN HYDROCHLORIDE 0.4 MG: 0.4 CAPSULE ORAL at 08:31

## 2025-03-01 RX ADMIN — IPRATROPIUM BROMIDE AND ALBUTEROL SULFATE 1 DOSE: 2.5; .5 SOLUTION RESPIRATORY (INHALATION) at 09:08

## 2025-03-01 RX ADMIN — DOXYCYCLINE HYCLATE 100 MG: 100 CAPSULE ORAL at 08:31

## 2025-03-01 RX ADMIN — SODIUM CHLORIDE, PRESERVATIVE FREE 10 ML: 5 INJECTION INTRAVENOUS at 08:31

## 2025-03-01 NOTE — PLAN OF CARE
PHYSICAL THERAPY TREATMENT     Patient: Donny Delgadillo (84 y.o. male)  Date: 3/1/2025  Diagnosis: Acute respiratory failure (HCC) [J96.00]  Hypoxemia [R09.02]  Lung mass [R91.8]  Pleural effusion [J90]  Malignant neoplasm of urinary bladder, unspecified site (HCC) [C67.9] Acute respiratory failure (HCC)      Precautions: Fall Risk                      Recommendations for nursing mobility: Out of bed to chair for meals, Encourage HEP in prep for ADLs/mobility; see handout for details, AD and gt belt for bed to chair , Amb to bathroom with AD and gait belt, and Assist x1    In place during session: Chest tube R side, no suction and EKG/telemetry   Chart, physical therapy assessment, plan of care and goals were reviewed.  ASSESSMENT  Patient continues with skilled PT services and is progressing towards goals. Pt semi-supine upon PT arrival, agreeable to session. Pt A&O x 4. (See below for objective details and assist levels).     Overall pt tolerated session well today with no pain reported.  Pt SBA for all bed mobility and transfers.  Pt amb  250 ft with no AD and with CGA. Pt amb with slow karen and decreased step length, with no dizziness or lightheadedness reported.  Pt experienced no LOB or unsteadiness. Pt did not experience any SOB during gait session; however, upon coming back to his room and checking O2 sats, pt was found to be at 84% with a HR on 118 bpm.  Pt instructed in pursed lip breathing and did not appear SOB during session but required 2-3 minutes of a rest break before O2 sats lindsay to 97%. RN notified of findings.  Will continue to benefit from skilled PT services, and will continue to progress as tolerated. Current PT DC recommendation Intermittent physical therapy up to 2-3x/week in previous living setting once medically appropriate.     Start of Session End of Session   SPO2 (%) 91 97   Heart Rate (BPM) 86 108     GOALS:    Problem: Physical Therapy - Adult  Goal: By Discharge: Performs  Feet  Assistive Device: Gait belt  Speed/Lorin: Slow                          Pain Ratin/10  reported  Pain Intervention(s):       Activity Tolerance:   Fair     After treatment patient left in no apparent distress:   Bed locked and returned to lowest position, Patient left in no apparent distress in bed, Call bell within reach, and Caregiver / family present, and nsg updated     COMMUNICATION/COLLABORATION:   The patient’s plan of care was discussed with: Registered nurse    Patient Education  Education Given To: Patient  Education Provided: Role of Therapy;Plan of Care  Education Method: Verbal;Demonstration  Education Outcome: Verbalized understanding;Continued education needed        Daina Golden, PT  Minutes: 19

## 2025-03-01 NOTE — DISCHARGE SUMMARY
Physician Discharge Summary     Patient ID:    Donny Delgadillo  [id number]  84 y.o.  1940    Admit date: 2/21/2025    Discharge date : 3/1/2025      Final Diagnoses:     Lung mass with pleural effusion  Acute hypoxic respiratory failure  COPD, acute exacerbation  Normocytic Anemia  BPH with urinary obstruction  Malignant neoplasm of urinary bladder  Essential hypertension    Reason for Hospitalization:    Donny Delgadillo is a 84 y.o. male presents with hypoxia.  He states that 3 weeks ago he had a cystoscopy diagnosing bladder cancer by his urologist.  He has had some right rib pain posterior lateral ribs and right lateral ribs and right upper flank since then shortness of breath on exertion.  Denies chest pain cough fevers.  Went to his urologist today for follow-up procedure to assess the extent of cancer minutes he was hypoxic and sent into the ER.      In the ED, CBC was unremarkable. Albumin 3.0. Magnesium and Troponin were unremarkable. BNP elevated to 693. Urine analysis showed hematuria. CXR showed right upper lobe mass concerning for neoplasm and small right pleural effusion. CT of the abdomen/pelvis showed \"bilateral cortical cysts, nonobstructing left intrarenal calculus.\"      Hospital Course:     On the first day on the medical floor (2/23/2025), pt denied chest pain, shortness of breath, HA, abdominal pain, and urinary symptoms. Pt was on room air. Oncology, urology, and pulmonology were following. Echo revealed EF 65-70%, mildly increased wall thickness, concentric remodeling, and tricuspid regurgitation. MRI revealed foraminal narrowing at C3-C4 through C7-T1, L3-L4, and L5-S1.      On the next day, pt was seen lying comfortably awake and alert. Pt was currently on room air. Thoracentesis was unable to be performed by IR, so CT-guided biopsy was scheduled for the next day. Blood cultures on 2/21 returned with gram positive cocci x1, but bio fire did not detect staph aureus.

## 2025-03-01 NOTE — PROGRESS NOTES
Hospitalist Progress Note               Daily Progress Note: 3/1/2025      Hospital Day: 9     Chief complaint:   Chief Complaint   Patient presents with    hypoxia        Subjective:   Hospital course to date:    Donny Delgadillo is a 84 y.o. male presents with hypoxia.  He states that 3 weeks ago he had a cystoscopy diagnosing bladder cancer by his urologist.  He has had some right rib pain posterior lateral ribs and right lateral ribs and right upper flank since then shortness of breath on exertion.  Denies chest pain cough fevers.  Went to his urologist today for follow-up procedure to assess the extent of cancer minutes he was hypoxic and sent into the ER.     In the ED, CBC was unremarkable. Albumin 3.0. Magnesium and Troponin were unremarkable. BNP elevated to 693. Urine analysis showed hematuria. CXR showed right upper lobe mass concerning for neoplasm and small right pleural effusion. CT of the abdomen/pelvis showed \"bilateral cortical cysts, nonobstructing left intrarenal calculus.\"     2/23/2025: Pt denied chest pain, shortness of breath, HA, abdominal pain, and urinary symptoms. Pt was on room air. Oncology, urology, and pulmonology were following. Echo revealed EF 65-70%, mildly increased wall thickness, concentric remodeling, and tricuspid regurgitation. MRI revealed foraminal narrowing at C3-C4 through C7-T1, L3-L4, and L5-S1.     2/24/2025: Pt was seen lying comfortably awake and alert. Pt was currently on room air. Thoracentesis was unable to be performed by IR, so CT-guided biopsy was scheduled for the next day. Blood cultures on 2/21 returned with gram positive cocci x1, but bio fire did not detect staph aureus. Vancomycin was held unless pt exhibited signs of infection. RBC decreased to 3.95 and Hemoglobin 11.4.     He underwent CT-guided lung biopsy on 2/26 but developed a small pneumothorax and pigtail catheter was subsequently placed.    Chest x-ray on 2/28 showed resolved  Critical electrolyte abnormalities requiring IV replacement and close serial monitoring  [] SQ Insulin SS- monitoring serial FSBS for Hypoglycemic adverse drug reaction  [] Other -   [] Change in code status:    [] Decision to escalate care:    [] Major surgery/procedure with associated risk factors:    ----------------------------------------------------------------------  C. Data (any 2)  [] Discussed current management and discharge planning options with Case Management.  [] Discussed management of the case with:    [] Telemetry personally reviewed and interpreted as documented above    [] Imaging personally reviewed and interpreted, includes:    [] Data Review (any 3)  [] All available Consultant notes from yesterday/today were reviewed  [] All current labs were reviewed and interpreted for clinical significance   [] Appropriate follow-up labs were ordered  [] Collateral history obtained from:               Assessment:    Lung mass with pleural effusion  Pneumothorax following lung biopsy  - IR performed CT-guided lung biopsy 2/26, subsequently required pigtail catheter placement due to pneumothorax  -MRI to rule out metastasis displayed foraminal narrowing at C3-C4 through C7-T1, L3-L4, and L5-S1  -Large RUL mass w/ mediastinal/hilar lymphadenopathy & secondary moderate right pleural effusion noted on CT chest       Acute hypoxic respiratory failure, resolved  - Underlying Pleural effusions the suspected etiology with underlying malignancy the suspected etiology  -CXR: Large oval opacity in the right upper lung concerning for neoplasm, small right pleural effusion and bilateral lower lobe parenchymal scarring   -CTA chest: 9 x 4.3 cm right upper lobe mass, severe emphysema, moderate right pleural effusion, mediastinal hilar lymphadenopathy, pulmonary hypertension       Bacteremia due to Micrococcus luteus, likely contaminant    COPD, acute exacerbation  -on duonebs, doxycycline, and prednisone    Normocytic

## 2025-03-01 NOTE — PROGRESS NOTES
Pulmonology Progress Note    Subjective:     Patient  seen examined   Overnight events noted     Lying in bed comfortably   Awake and alert   On 2 L nasal cannula oxygen   No acute distress   Right pigtail catheter in place   No air leak   Lung expanded on chest x-ray this  morning     Chest tube clamped   Repeat chest x-ray shows no pneumothorax    Current Facility-Administered Medications   Medication Dose Route Frequency Provider Last Rate Last Admin    ipratropium 0.5 mg-albuterol 2.5 mg (DUONEB) nebulizer solution 1 Dose  1 Dose Inhalation BID RT Juan Manuel Kelley MD   1 Dose at 03/01/25 0908    ipratropium 0.5 mg-albuterol 2.5 mg (DUONEB) nebulizer solution 1 Dose  1 Dose Inhalation Q4H PRN Juan Manuel Kelley MD        doxycycline hyclate (VIBRAMYCIN) capsule 100 mg  100 mg Oral 2 times per day Caden Fallon DO   100 mg at 03/01/25 0831    albuterol sulfate HFA (PROVENTIL;VENTOLIN;PROAIR) 108 (90 Base) MCG/ACT inhaler 2 puff  2 puff Inhalation Q6H PRN Juan Manuel Kelley MD        amLODIPine (NORVASC) tablet 10 mg  10 mg Oral Daily Dionicio Hathaway, DO   10 mg at 03/01/25 0831    tamsulosin (FLOMAX) capsule 0.4 mg  0.4 mg Oral Daily Dionicio Hathaway, DO   0.4 mg at 03/01/25 0831    sodium chloride flush 0.9 % injection 5-40 mL  5-40 mL IntraVENous 2 times per day Dionicio Hathaway, DO   10 mL at 03/01/25 0831    sodium chloride flush 0.9 % injection 5-40 mL  5-40 mL IntraVENous PRN Dionicio Hathaway, DO        0.9 % sodium chloride infusion   IntraVENous PRN Dionicio Hathaway, DO        potassium chloride (KLOR-CON M) extended release tablet 40 mEq  40 mEq Oral PRN Dionicio Hathaway, DO        Or    potassium bicarb-citric acid (EFFER-K) effervescent tablet 40 mEq  40 mEq Oral PRN Dionicio Hathaway, DO        Or    potassium chloride 10 mEq/100 mL IVPB (Peripheral Line)  10 mEq IntraVENous PRN Dionicio Hathaway, DO        magnesium sulfate 2000 mg in 50 mL IVPB premix  2,000 mg IntraVENous PRN Dionicio Hathaway, DO         nondistention. The  small bowel and colon are unremarkable. The appendix is not well visualized.  There is no ascites.     IMPRESSION:  1.CHEST: Large right upper lobe mass with surrounding parenchymal and pleural  nodularity, consistent with primary or secondary neoplasm. Severe emphysematous  change. Moderate right pleural effusion. Mediastinal and hilar lymphadenopathy.  Enlarged main pulmonary outflow tract, correlate for pulmonary hypertension. No  pulmonary embolus.  2. ABDOMEN/PELVIS: Bilateral renal cortical cysts, nonobstructing left  intrarenal calculus, asymmetric left bladder wall thickening which may correlate  with recently diagnosed bladder cancer. Other incidental findings described  above.      Assessment:     Patient is an 84-year-old male with a history of COPD, GERD, bladder cancer, BPH, and hypertension who presented to the hospital with hypoxemia and was found to have a large right upper lobe mass on chest imaging.    Plan:     1.)  Status post acute hypoxemic respiratory failure  It had resolved now.  Patient was on room air however postprocedure he was placed on nasal cannula oxygen at 2 L/min   Patient has advanced bullous lung disease  2.)  Acute exacerbation of COPD  -Agree with DuoNeb nebulizer treatments every 6 hours while awake, oral doxycycline for 5 days, and prednisone 40 mg p.o. daily for 5 days.  -Overall seems improved   -Encouragement given for quitting smoking  -Needs close outpatient pulmonary follow-up with PFTs after discharge. Dr. Fallon will see the patient after discharge.    3.)  Right upper lobe mass  -CTA chest/abdomen/pelvis shows a large right upper lobe mass measuring 9.0 x 4.3 cm with surrounding nodularity, severe emphysema and bullae bilaterally, a small to moderate right-sided pleural effusion, and mediastinal/hilar adenopathy.    -All of these findings are extremely concerning for underlying malignancy.    Patient underwent CT-guided biopsy of right upper lobe

## 2025-03-01 NOTE — PROGRESS NOTES
Patient identified for chest tube removal  Chest tube previously clamped,   Dressing removed, site prepped  Skin suture removed intact,   Pigtail catheter and internal catheter suture removed intact   Patient tolerated   Petroleum/dry gauze and Tegaderm x 2 applied to site  Patient instructed to removed dressing after 2 days  Report called to Primary RN Ashley

## 2025-03-01 NOTE — PLAN OF CARE
Problem: Discharge Planning  Goal: Discharge to home or other facility with appropriate resources  2/28/2025 1956 by Flakita Espinosa RN  Outcome: Progressing  2/28/2025 1956 by Flakita Espinosa RN  Outcome: Progressing     Problem: Pain  Goal: Verbalizes/displays adequate comfort level or baseline comfort level  2/28/2025 1956 by Flakita Espinosa RN  Outcome: Progressing  2/28/2025 1956 by Flakita Espinosa RN  Outcome: Progressing     Problem: ABCDS Injury Assessment  Goal: Absence of physical injury  2/28/2025 1956 by Flakita Espinosa RN  Outcome: Progressing  2/28/2025 1956 by Flakita Espinosa RN  Outcome: Progressing     Problem: Respiratory - Adult  Goal: Achieves optimal ventilation and oxygenation  2/28/2025 1956 by Flakita Espinosa RN  Outcome: Progressing  2/28/2025 1956 by Flakita Espinosa RN  Outcome: Progressing     Problem: Skin/Tissue Integrity - Adult  Goal: Skin integrity remains intact  2/28/2025 1956 by Flakita Espinosa RN  Outcome: Progressing  2/28/2025 1956 by Flakita Espinosa RN  Outcome: Progressing     Problem: Skin/Tissue Integrity - Adult  Goal: Incisions, wounds, or drain sites healing without S/S of infection  2/28/2025 1956 by Flakita Espinosa RN  Outcome: Progressing  2/28/2025 1956 by Flakita Espinosa RN  Outcome: Progressing     Problem: Skin/Tissue Integrity - Adult  Goal: Oral mucous membranes remain intact  Outcome: Progressing

## 2025-03-02 LAB
BACTERIA SPEC CULT: NORMAL
BACTERIA SPEC CULT: NORMAL
Lab: NORMAL
Lab: NORMAL

## 2025-03-18 ENCOUNTER — HOSPITAL ENCOUNTER (OUTPATIENT)
Facility: HOSPITAL | Age: 85
Discharge: HOME | End: 2025-03-21
Payer: MEDICARE

## 2025-03-18 ENCOUNTER — CLINICAL DOCUMENTATION (OUTPATIENT)
Facility: HOSPITAL | Age: 85
End: 2025-03-18

## 2025-03-18 VITALS
DIASTOLIC BLOOD PRESSURE: 103 MMHG | SYSTOLIC BLOOD PRESSURE: 150 MMHG | HEART RATE: 85 BPM | WEIGHT: 134.6 LBS | BODY MASS INDEX: 19.31 KG/M2 | OXYGEN SATURATION: 96 % | TEMPERATURE: 98.3 F | RESPIRATION RATE: 18 BRPM

## 2025-03-18 DIAGNOSIS — C34.11 MALIGNANT NEOPLASM OF RIGHT UPPER LOBE OF LUNG (HCC): Primary | ICD-10-CM

## 2025-03-18 PROCEDURE — 99203 OFFICE O/P NEW LOW 30 MIN: CPT

## 2025-03-18 RX ORDER — OXYCODONE AND ACETAMINOPHEN 5; 325 MG/1; MG/1
1 TABLET ORAL EVERY 4 HOURS PRN
COMMUNITY

## 2025-03-18 ASSESSMENT — PAIN SCALES - GENERAL: PAINLEVEL_OUTOF10: 0

## 2025-03-18 NOTE — PROGRESS NOTES
85, temperature 98.3 °F (36.8 °C), temperature source Temporal, resp. rate 18, weight 61.1 kg (134 lb 9.6 oz), SpO2 96%.   Performance Status: ECOG 1, No physically strenuous activity, but ambulatory and able to carry out light or sedentary work (eg office work, light house work)  Constitutional: Pleasant, sitting comfortably in no acute distress.   HEENT: Moist mucous membranes.  Cardiac: Good peripheral perfusion, no upper or lower extremity edema bilaterally.  Pulmonary: No increased work of breathing. Symmetric chest rise, decreased breath sounds in RUL.  Skin: Warm, dry, no rashes noted.  Neurologic: Alert and oriented.  Psychiatric: Cooperative to commands and responds to questions appropriately.    Imaging   Imaging reports were reviewed as detailed in his history above    Assessment & Plan   Mr. Delgadillo is a 84 y.o. male with a cT4N2 SCC of RUL.  Final stage pending PET CT and brain MRI.    The natural history of nonsmall cell lung cancer (NSCLC) was discussed with him and his family, including specifics related to his cancer such as imaging and pathology.  Treatment options were discussed as well including surgery, chemotherapy, and radiation therapy.  He will not be a surgical candidate given size of tumor and his age.    Chemotherapy and radiation were discussed with her including sequential vs concurrent therapy.  Ultimately I recommend concurrent if he can tolerate it based on improved overall survival from multiple randomized trials (RTOG 9410, NPC 9501, CALGB 39289, [facility] meta-analysis).    For radiation therapy, I recommend 6 weeks of intensity modulated radiation therapy (IMRT), based on randomized data showing improved local/regional control and overall survival with 6 weeks compared to >7 weeks (RTOG 0617).    The logistics, risks, benefits, and side effects were discussed with her including a planning session followed by daily M-F radiation for 30 treatments.  Potential side effects

## 2025-03-18 NOTE — PROGRESS NOTES
[facility] Oncology Social Work   Psychosocial Assessment      Location: Radiation Oncology at [facility]  Patient: Donny Delgadillo (1940)    Reason for Assessment: Initial Assessment    Advance Care Planning: ACP conversation deferred at this time    Sources of Information: Patient, Family    Mental Status: Alert, Oriented to Person, Oriented to Place, Oriented to Time, Oriented to Situation    Relationship Status:     Living Circumstances: Lives Alone    Employment Status: Retired    Transportation Resources: Self, Family/Friends    Barriers to Learning: No Barriers Identified    Financial/Legal Concerns: No Concerns Identified    Latter-day/Spiritual/Existential: Conversation deferred    Support System: Strong Support: The patient has a reliable, consistent, and engaged network of family, friends, or community resources that effectively meet their emotional, social, and practical needs.  Patient's Primary Support Person/Group: daughter, son, neighbors, family, friends    History of Mental Health / Substance Use Disorders: No  Conversation deferred    Coping with Illness: Situational Anxiety           Concerns/Barriers to Care: None Identified    Narrative: Patient is a 84 year old male who is diagnosed with lung cancer. Patient arrived with his daughter. Patient reports no concerns. Patient's daughter plans to drop off [place] paperwork. Daughter will drive him to his radiation treatments. Patient drives himself, but will not during treatment. Patient and daughter described his support system as \"really good,\" citing  daughter, son, neighbors, family, and friends as patients support and mentioning they will do anything to help him.     SW gave overview of support groups. Patient was receptive and took a support group flyer.       Information/Education/Referrals Provided:    Support Groups/Peer Support    Plan: Follow up as needed.

## 2025-03-18 NOTE — PROGRESS NOTES
NCCN Distress Thermometer    Radiation Oncology at [facility]    Date Screening Completed: 3/18/2025    Screening Declined:  [] Yes    Number that best describes how much distress you've experienced in the past week, including today?  0 [x] - No distress 1 []      2 []      3 []      4 []       5 []       6 []      7 []      8 []      9 []       10 [] - Extreme distress    PROBLEM LIST  Have you had concerns about any of the items below in the past week, including today?      Physical Concerns Practical Concerns   [] Pain [] Taking care of myself    [] Sleep [] Taking care of others    [] Fatigue [] Safety   [] Tobacco use  [] Work   [] Substance use  [] School   [] Memory or concentration [] Housing/Utilities   [] Sexual health [] Finances   [] Changes in eating  [] Insurance   [] Loss or change of physical abilities  [] Transportation    []    Emotional Concerns [] Having enough food   [] Worry or anxiety [] Access to medicine   [] Sadness or depression [] Treatment decisions   [] Loss of interest or enjoyment     [] Grief or loss  Spiritual or Catholic Concerns   [] Fear [] Sense of meaning or purpose   [] Loneliness  [] Changes in segundo or beliefs   [] Anger [] Death, dying, or afterlife   [] Changes in appearance [] Conflict between beliefs and cancer treatments    [] Feelings of worthlessness or being a burden [] Relationship with the sacred    [] Ritual or dietary needs    Social Concerns     [] Relationship with spouse or partner     [] Relationship with children    [] Relationship with family members     [] Relationship with friends or coworkers     [] Communication with health care team     [] Ability to have children     [] Prejudice or discrimination        Other Concerns:

## 2025-03-29 ENCOUNTER — HOSPITAL ENCOUNTER (OUTPATIENT)
Facility: HOSPITAL | Age: 85
Discharge: HOME | End: 2025-04-01
Attending: INTERNAL MEDICINE
Payer: MEDICARE

## 2025-03-29 DIAGNOSIS — R91.8 LUNG MASS: ICD-10-CM

## 2025-03-29 PROCEDURE — 78815 PET IMAGE W/CT SKULL-THIGH: CPT

## 2025-03-29 PROCEDURE — A9609 HC RX DIAGNOSTIC RADIOPHARMACEUTICAL: HCPCS | Performed by: INTERNAL MEDICINE

## 2025-03-29 PROCEDURE — 3430000000 HC RX DIAGNOSTIC RADIOPHARMACEUTICAL: Performed by: INTERNAL MEDICINE

## 2025-03-29 RX ORDER — FLUDEOXYGLUCOSE F-18 500 MCI/ML
11.6 INJECTION INTRAVENOUS
Status: COMPLETED | OUTPATIENT
Start: 2025-03-29 | End: 2025-03-29

## 2025-03-29 RX ADMIN — FLUDEOXYGLUCOSE F-18 11.6 MILLICURIE: 500 INJECTION INTRAVENOUS at 15:42

## 2025-04-02 ENCOUNTER — HOSPITAL ENCOUNTER (OUTPATIENT)
Facility: HOSPITAL | Age: 85
Discharge: HOME | End: 2025-04-05
Attending: STUDENT IN AN ORGANIZED HEALTH CARE EDUCATION/TRAINING PROGRAM
Payer: MEDICARE

## 2025-04-02 PROCEDURE — 77290 THER RAD SIMULAJ FIELD CPLX: CPT

## 2025-04-03 ENCOUNTER — HOSPITAL ENCOUNTER (OUTPATIENT)
Facility: HOSPITAL | Age: 85
Discharge: HOME | End: 2025-04-03
Attending: STUDENT IN AN ORGANIZED HEALTH CARE EDUCATION/TRAINING PROGRAM
Payer: MEDICARE

## 2025-04-03 DIAGNOSIS — C34.11 MALIGNANT NEOPLASM OF RIGHT UPPER LOBE OF LUNG (HCC): ICD-10-CM

## 2025-04-03 PROCEDURE — A9579 GAD-BASE MR CONTRAST NOS,1ML: HCPCS | Performed by: STUDENT IN AN ORGANIZED HEALTH CARE EDUCATION/TRAINING PROGRAM

## 2025-04-03 PROCEDURE — 70553 MRI BRAIN STEM W/O & W/DYE: CPT

## 2025-04-03 PROCEDURE — 6360000004 HC RX CONTRAST MEDICATION: Performed by: STUDENT IN AN ORGANIZED HEALTH CARE EDUCATION/TRAINING PROGRAM

## 2025-04-03 RX ADMIN — GADOTERIDOL 12 ML: 279.3 INJECTION, SOLUTION INTRAVENOUS at 16:22

## 2025-04-08 RX ORDER — ACETAMINOPHEN 325 MG/1
650 TABLET ORAL EVERY 6 HOURS PRN
COMMUNITY

## 2025-04-09 ENCOUNTER — HOSPITAL ENCOUNTER (OUTPATIENT)
Facility: HOSPITAL | Age: 85
Discharge: HOME | End: 2025-04-12
Attending: INTERNAL MEDICINE
Payer: MEDICARE

## 2025-04-09 VITALS
RESPIRATION RATE: 18 BRPM | DIASTOLIC BLOOD PRESSURE: 88 MMHG | BODY MASS INDEX: 19.33 KG/M2 | HEART RATE: 109 BPM | HEIGHT: 70 IN | WEIGHT: 135 LBS | TEMPERATURE: 97.7 F | OXYGEN SATURATION: 93 % | SYSTOLIC BLOOD PRESSURE: 128 MMHG

## 2025-04-09 DIAGNOSIS — R91.8 LUNG MASS: ICD-10-CM

## 2025-04-09 LAB
ANION GAP SERPL CALC-SCNC: 6 MMOL/L (ref 2–12)
BASOPHILS # BLD: 0.04 K/UL (ref 0–0.1)
BASOPHILS NFR BLD: 0.5 % (ref 0–1)
BUN SERPL-MCNC: 16 MG/DL (ref 6–20)
BUN/CREAT SERPL: 16 (ref 12–20)
CA-I BLD-MCNC: 9.7 MG/DL (ref 8.5–10.1)
CHLORIDE SERPL-SCNC: 106 MMOL/L (ref 97–108)
CO2 SERPL-SCNC: 27 MMOL/L (ref 21–32)
CREAT SERPL-MCNC: 0.97 MG/DL (ref 0.7–1.3)
DIFFERENTIAL METHOD BLD: NORMAL
EOSINOPHIL # BLD: 0.12 K/UL (ref 0–0.4)
EOSINOPHIL NFR BLD: 1.6 % (ref 0–7)
ERYTHROCYTE [DISTWIDTH] IN BLOOD BY AUTOMATED COUNT: 13.3 % (ref 11.5–14.5)
GLUCOSE SERPL-MCNC: 102 MG/DL (ref 65–100)
HCT VFR BLD AUTO: 38.4 % (ref 36.6–50.3)
HGB BLD-MCNC: 12.6 G/DL (ref 12.1–17)
IMM GRANULOCYTES # BLD AUTO: 0.03 K/UL (ref 0–0.04)
IMM GRANULOCYTES NFR BLD AUTO: 0.4 % (ref 0–0.5)
INR PPP: 1.1 (ref 0.9–1.1)
LYMPHOCYTES # BLD: 2.24 K/UL (ref 0.8–3.5)
LYMPHOCYTES NFR BLD: 29.2 % (ref 12–49)
MCH RBC QN AUTO: 29 PG (ref 26–34)
MCHC RBC AUTO-ENTMCNC: 32.8 G/DL (ref 30–36.5)
MCV RBC AUTO: 88.5 FL (ref 80–99)
MONOCYTES # BLD: 0.41 K/UL (ref 0–1)
MONOCYTES NFR BLD: 5.3 % (ref 5–13)
NEUTS SEG # BLD: 4.84 K/UL (ref 1.8–8)
NEUTS SEG NFR BLD: 63 % (ref 32–75)
NRBC # BLD: 0 K/UL (ref 0–0.01)
NRBC BLD-RTO: 0 PER 100 WBC
PLATELET # BLD AUTO: 302 K/UL (ref 150–400)
PMV BLD AUTO: 10 FL (ref 8.9–12.9)
POTASSIUM SERPL-SCNC: 4.2 MMOL/L (ref 3.5–5.1)
PROTHROMBIN TIME: 14.4 SEC (ref 11.9–14.6)
RBC # BLD AUTO: 4.34 M/UL (ref 4.1–5.7)
SODIUM SERPL-SCNC: 139 MMOL/L (ref 136–145)
WBC # BLD AUTO: 7.7 K/UL (ref 4.1–11.1)

## 2025-04-09 PROCEDURE — 99152 MOD SED SAME PHYS/QHP 5/>YRS: CPT

## 2025-04-09 PROCEDURE — 2500000003 HC RX 250 WO HCPCS: Performed by: STUDENT IN AN ORGANIZED HEALTH CARE EDUCATION/TRAINING PROGRAM

## 2025-04-09 PROCEDURE — 36415 COLL VENOUS BLD VENIPUNCTURE: CPT

## 2025-04-09 PROCEDURE — 6360000002 HC RX W HCPCS: Performed by: STUDENT IN AN ORGANIZED HEALTH CARE EDUCATION/TRAINING PROGRAM

## 2025-04-09 PROCEDURE — 76942 ECHO GUIDE FOR BIOPSY: CPT

## 2025-04-09 PROCEDURE — 85025 COMPLETE CBC W/AUTO DIFF WBC: CPT

## 2025-04-09 PROCEDURE — 85610 PROTHROMBIN TIME: CPT

## 2025-04-09 PROCEDURE — 80048 BASIC METABOLIC PNL TOTAL CA: CPT

## 2025-04-09 PROCEDURE — C1788 PORT, INDWELLING, IMP: HCPCS

## 2025-04-09 PROCEDURE — 36560 INSERT TUNNELED CV CATH: CPT

## 2025-04-09 PROCEDURE — 99156 MOD SED OTH PHYS/QHP 5/>YRS: CPT

## 2025-04-09 RX ORDER — MIDAZOLAM HYDROCHLORIDE 2 MG/2ML
INJECTION, SOLUTION INTRAMUSCULAR; INTRAVENOUS PRN
Status: COMPLETED | OUTPATIENT
Start: 2025-04-09 | End: 2025-04-09

## 2025-04-09 RX ORDER — SODIUM CHLORIDE 9 MG/ML
INJECTION, SOLUTION INTRAVENOUS CONTINUOUS
Status: DISCONTINUED | OUTPATIENT
Start: 2025-04-09 | End: 2025-04-13 | Stop reason: HOSPADM

## 2025-04-09 RX ORDER — FENTANYL CITRATE 50 UG/ML
INJECTION, SOLUTION INTRAMUSCULAR; INTRAVENOUS PRN
Status: COMPLETED | OUTPATIENT
Start: 2025-04-09 | End: 2025-04-09

## 2025-04-09 RX ADMIN — MIDAZOLAM HYDROCHLORIDE 1 MG: 1 INJECTION, SOLUTION INTRAMUSCULAR; INTRAVENOUS at 09:39

## 2025-04-09 RX ADMIN — MIDAZOLAM HYDROCHLORIDE 0.5 MG: 1 INJECTION, SOLUTION INTRAMUSCULAR; INTRAVENOUS at 09:43

## 2025-04-09 RX ADMIN — CEFAZOLIN 2000 MG: 1 INJECTION, POWDER, FOR SOLUTION INTRAMUSCULAR; INTRAVENOUS at 09:33

## 2025-04-09 RX ADMIN — FENTANYL CITRATE 50 MCG: 50 INJECTION, SOLUTION INTRAMUSCULAR; INTRAVENOUS at 09:39

## 2025-04-09 RX ADMIN — MIDAZOLAM HYDROCHLORIDE 0.5 MG: 1 INJECTION, SOLUTION INTRAMUSCULAR; INTRAVENOUS at 09:47

## 2025-04-09 ASSESSMENT — PAIN - FUNCTIONAL ASSESSMENT
PAIN_FUNCTIONAL_ASSESSMENT: NONE - DENIES PAIN
PAIN_FUNCTIONAL_ASSESSMENT: NONE - DENIES PAIN
PAIN_FUNCTIONAL_ASSESSMENT: 0-10

## 2025-04-09 NOTE — PROGRESS NOTES
Spiritual Health History and Assessment/Progress Note  [facility]    Pre-Op,  ,  ,      Name: Donny Delgadillo MRN: [id number]    Age: 84 y.o.     Sex: male   Language: English   Adventism: Unknown   <principal problem not specified>     Date: 4/9/2025            Total Time Calculated: 15 min              Spiritual Assessment began in [facility]        Referral/Consult From: Rounding   Encounter Overview/Reason: Pre-Op  Service Provided For: Patient and family together    Cinthya, Belief, Meaning:   Patient identifies as spiritual, is connected with a cinthya tradition or spiritual practice, and has beliefs or practices that help with coping during difficult times  Family/Friends identify as spiritual, are connected with a cinthya tradition or spiritual practice, and have beliefs or practices that help with coping during difficult times      Importance and Influence:  Patient has spiritual/personal beliefs that influence decisions regarding their health  Family/Friends have spiritual/personal beliefs that influence decisions regarding the patient's health    Community:  Patient feels well-supported. Support system includes: Children and Cinthya Community  Family/Friends feel well-supported. Support system includes: Cinthya Community and Extended family    Assessment and Plan of Care:     Patient Interventions include: Facilitated expression of thoughts and feelings, Explored spiritual coping/struggle/distress, Engaged in theological reflection, Affirmed coping skills/support systems, and Facilitated life review and/ or legacy  Family/Friends Interventions include: Facilitated expression of thoughts and feelings, Explored spiritual coping/struggle/distress, Engaged in theological reflection, Affirmed coping skills/support systems, and Facilitated life review and/or legacy    Patient Plan of Care: Spiritual Care available upon further referral  Family/Friends Plan of Care: Spiritual

## 2025-04-09 NOTE — H&P
INTERVENTIONAL RADIOLOGY  Preoperative History and Physical      Patient:  Donny Delgadillo  :  1940  Age:  84 y.o.  MRN:  [id number]  Today's Date:  2025      CC / HPI   Donny Delgadillo is a 84 y.o. male with a history of a lung mass who presents for Image Guided Port Insertion.    PAST MEDICAL HISTORY  Past Medical History:   Diagnosis Date    Bladder cancer (HCC)     BPH with urinary obstruction     Diverticulosis of colon without hemorrhage     Essential hypertension     Hearing loss 2025    History of colon polyps 2025    Lung cancer (HCC)     Prostate cancer (HCC)        PAST SURGICAL HISTORY  Past Surgical History:   Procedure Laterality Date    CATARACT REMOVAL Bilateral     CT BIOPSY SOFT TISSUE NECK  2025    CT BIOPSY LUNG/MEDIASTINUM PERC 2025 Karishma Shaffer PA-C [facility]    CT GUIDED PLEURAL DRAINAGE W CATH PERC  2025    CT GUIDED PLEURAL DRAINAGE W CATH PERC 2025 Karishma Shaffer PA-C [facility]       SOCIAL HISTORY  Social History     Socioeconomic History    Marital status:      Spouse name: Not on file    Number of children: Not on file    Years of education: Not on file    Highest education level: Not on file   Occupational History    Not on file   Tobacco Use    Smoking status: Former     Types: Cigarettes, Cigars     Passive exposure: Past    Smokeless tobacco: Former   Vaping Use    Vaping status: Never Used   Substance and Sexual Activity    Alcohol use: Not Currently    Drug use: Never    Sexual activity: Not on file   Other Topics Concern    Not on file   Social History Narrative    Not on file     Social Drivers of Health     Financial Resource Strain: Not on file   Food Insecurity: No Food Insecurity (2025)    Hunger Vital Sign     Worried About Running Out of Food in the Last Year: Never true     Ran Out of Food in the Last Year: Never true   Transportation Needs: No Transportation Needs (2025)    PRAPARE - Transportation

## 2025-04-21 ENCOUNTER — APPOINTMENT (OUTPATIENT)
Facility: HOSPITAL | Age: 85
End: 2025-04-21
Attending: STUDENT IN AN ORGANIZED HEALTH CARE EDUCATION/TRAINING PROGRAM
Payer: MEDICARE

## 2025-04-24 ENCOUNTER — TRANSCRIBE ORDERS (OUTPATIENT)
Facility: HOSPITAL | Age: 85
End: 2025-04-24

## 2025-04-24 ENCOUNTER — HOSPITAL ENCOUNTER (OUTPATIENT)
Facility: HOSPITAL | Age: 85
Discharge: HOME | End: 2025-04-27
Payer: MEDICARE

## 2025-04-24 DIAGNOSIS — C34.81 MALIGNANT NEOPLASM OF OVERLAPPING SITES OF RIGHT LUNG (HCC): ICD-10-CM

## 2025-04-24 DIAGNOSIS — R91.8 LUNG MASS: Primary | ICD-10-CM

## 2025-04-24 DIAGNOSIS — R91.8 LUNG MASS: ICD-10-CM

## 2025-04-24 PROCEDURE — 71046 X-RAY EXAM CHEST 2 VIEWS: CPT

## 2025-04-25 ENCOUNTER — HOSPITAL ENCOUNTER (OUTPATIENT)
Facility: HOSPITAL | Age: 85
End: 2025-04-25
Attending: STUDENT IN AN ORGANIZED HEALTH CARE EDUCATION/TRAINING PROGRAM
Payer: MEDICARE

## 2025-04-25 VITALS
OXYGEN SATURATION: 92 % | WEIGHT: 131 LBS | TEMPERATURE: 98.5 F | RESPIRATION RATE: 18 BRPM | BODY MASS INDEX: 18.8 KG/M2 | DIASTOLIC BLOOD PRESSURE: 99 MMHG | HEART RATE: 94 BPM | SYSTOLIC BLOOD PRESSURE: 146 MMHG

## 2025-04-25 DIAGNOSIS — C34.11 MALIGNANT NEOPLASM OF RIGHT UPPER LOBE OF LUNG (HCC): Primary | ICD-10-CM

## 2025-04-25 PROCEDURE — 99213 OFFICE O/P EST LOW 20 MIN: CPT

## 2025-04-25 ASSESSMENT — PAIN DESCRIPTION - LOCATION: LOCATION: BACK

## 2025-04-25 ASSESSMENT — PAIN DESCRIPTION - ORIENTATION: ORIENTATION: RIGHT

## 2025-04-25 ASSESSMENT — PAIN SCALES - GENERAL: PAINLEVEL_OUTOF10: 5

## 2025-04-25 NOTE — ADDENDUM NOTE
Encounter addended by: Francis Zurita MD on: 4/25/2025 10:47 AM   Actions taken: Clinical Note Signed

## 2025-04-25 NOTE — PROGRESS NOTES
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  Oncologic History   Started pembrolizumab    Interval History   Mr. Delgadillo arrives today for follow up to discuss palliative RT.    Unfortunately since our last visit, PET CT revealed widespread metastatic disease.    He has started pembrolizumab with Dr. Tillman.  Over the last month he has developed more thoracic pain, mainly centered around the upper lateral Right rib cage where his mass abuts rib.  He also reports anterior mack-sternum pain at the inferior portion of the sternum which corresponds with a lesion on PET CT.  He reports another posterior rib pain in around the 9th -10th right rib.  He is on O2 with sats in low 90's.    IMAGING:  PET CT 3/29/25  IMPRESSION:  The mass lesion in the right upper lobe is hypermetabolic and  consistent with the known neoplasm. Multiple focal areas of hypermetabolic  pleural-based thickening throughout the right hemithorax are concerning for  underlying neoplasm as well. Areas of increased tracer activity right hepatic  lobe suggest metastatic disease.    MRI BRAIN 4/3/25  IMPRESSION:     1. No evidence of intracranial metastatic disease.  2. Chronic microvascular ischemic disease with no acute process.     Review of Systems:  A complete review of systems was obtained and negative except as described above.    Interval Imaging/Labs   Above imaging/lab reports were reviewed.    Allergies and Medications   No Known Allergies    Current Outpatient Medications   Medication Instructions    acetaminophen (TYLENOL) 650 mg, EVERY 6 HOURS PRN    amLODIPine (NORVASC) 10 mg, DAILY    oxyCODONE-acetaminophen (PERCOCET) 5-325 MG per tablet 1 tablet, EVERY 4 HOURS PRN    OXYGEN Inhale into the lungs    tamsulosin (FLOMAX) 0.4 mg, DAILY        Physical Exam:   Vitals:

## 2025-04-28 ENCOUNTER — HOSPITAL ENCOUNTER (OUTPATIENT)
Facility: HOSPITAL | Age: 85
Discharge: HOME | End: 2025-05-01
Attending: STUDENT IN AN ORGANIZED HEALTH CARE EDUCATION/TRAINING PROGRAM
Payer: MEDICARE

## 2025-04-28 ENCOUNTER — APPOINTMENT (OUTPATIENT)
Facility: HOSPITAL | Age: 85
End: 2025-04-28
Attending: STUDENT IN AN ORGANIZED HEALTH CARE EDUCATION/TRAINING PROGRAM
Payer: MEDICARE

## 2025-04-28 PROCEDURE — 77338 DESIGN MLC DEVICE FOR IMRT: CPT

## 2025-04-28 PROCEDURE — 77301 RADIOTHERAPY DOSE PLAN IMRT: CPT

## 2025-04-28 PROCEDURE — 77300 RADIATION THERAPY DOSE PLAN: CPT

## 2025-05-01 ENCOUNTER — HOSPITAL ENCOUNTER (OUTPATIENT)
Facility: HOSPITAL | Age: 85
End: 2025-05-01
Attending: STUDENT IN AN ORGANIZED HEALTH CARE EDUCATION/TRAINING PROGRAM
Payer: MEDICARE

## 2025-05-01 ENCOUNTER — HOSPITAL ENCOUNTER (OUTPATIENT)
Facility: HOSPITAL | Age: 85
Discharge: HOME | End: 2025-05-04
Attending: STUDENT IN AN ORGANIZED HEALTH CARE EDUCATION/TRAINING PROGRAM
Payer: MEDICARE

## 2025-05-01 PROCEDURE — 77290 THER RAD SIMULAJ FIELD CPLX: CPT

## 2025-05-05 ENCOUNTER — APPOINTMENT (OUTPATIENT)
Facility: HOSPITAL | Age: 85
End: 2025-05-05
Attending: STUDENT IN AN ORGANIZED HEALTH CARE EDUCATION/TRAINING PROGRAM
Payer: MEDICARE

## 2025-05-05 ENCOUNTER — HOSPITAL ENCOUNTER (OUTPATIENT)
Facility: HOSPITAL | Age: 85
Discharge: HOME | End: 2025-05-08
Attending: STUDENT IN AN ORGANIZED HEALTH CARE EDUCATION/TRAINING PROGRAM
Payer: MEDICARE

## 2025-05-05 DIAGNOSIS — C34.11 MALIGNANT NEOPLASM OF RIGHT UPPER LOBE OF LUNG (HCC): Primary | ICD-10-CM

## 2025-05-05 LAB
RAD ONC ARIA COURSE FIRST TREATMENT DATE: NORMAL
RAD ONC ARIA COURSE ID: NORMAL
RAD ONC ARIA COURSE INTENT: NORMAL
RAD ONC ARIA COURSE LAST TREATMENT DATE: NORMAL
RAD ONC ARIA COURSE SESSION NUMBER: 1
RAD ONC ARIA COURSE START DATE: NORMAL
RAD ONC ARIA COURSE TREATMENT ELAPSED DAYS: 0
RAD ONC ARIA PLAN FRACTIONS TREATED TO DATE: 1
RAD ONC ARIA PLAN ID: NORMAL
RAD ONC ARIA PLAN PRESCRIBED DOSE PER FRACTION: 2.5 GY
RAD ONC ARIA PLAN PRIMARY REFERENCE POINT: NORMAL
RAD ONC ARIA PLAN TOTAL FRACTIONS PRESCRIBED: 15
RAD ONC ARIA PLAN TOTAL PRESCRIBED DOSE: 3750 CGY
RAD ONC ARIA REFERENCE POINT DOSAGE GIVEN TO DATE: 1.42 GY
RAD ONC ARIA REFERENCE POINT DOSAGE GIVEN TO DATE: 2.5 GY
RAD ONC ARIA REFERENCE POINT ID: NORMAL
RAD ONC ARIA REFERENCE POINT ID: NORMAL
RAD ONC ARIA REFERENCE POINT SESSION DOSAGE GIVEN: 1.42 GY
RAD ONC ARIA REFERENCE POINT SESSION DOSAGE GIVEN: 2.5 GY

## 2025-05-05 PROCEDURE — 77300 RADIATION THERAPY DOSE PLAN: CPT

## 2025-05-05 PROCEDURE — 77386 HC NTSTY MODUL RAD TX DLVR CPLX: CPT

## 2025-05-05 PROCEDURE — 77301 RADIOTHERAPY DOSE PLAN IMRT: CPT

## 2025-05-05 PROCEDURE — 77338 DESIGN MLC DEVICE FOR IMRT: CPT

## 2025-05-05 ASSESSMENT — PATIENT HEALTH QUESTIONNAIRE - PHQ9
SUM OF ALL RESPONSES TO PHQ QUESTIONS 1-9: 2
2. FEELING DOWN, DEPRESSED OR HOPELESS: SEVERAL DAYS
SUM OF ALL RESPONSES TO PHQ QUESTIONS 1-9: 2
1. LITTLE INTEREST OR PLEASURE IN DOING THINGS: SEVERAL DAYS

## 2025-05-05 NOTE — PROGRESS NOTES
Cancer [place] at [facility]  [facility]      RADIATION ONCOLOGY WEEKLY PROGRESS NOTE    Encounter Date: 05/05/25   Patient Name: Donny Delgadillo  YOB: 1940  Medical Record Number: [id number]    DIAGNOSIS:   No diagnosis found. Metastatic lung cancer.   STAGING:   Cancer Staging   No matching staging information was found for the patient.    AJCC Staging has been reviewed    ASSESSMENT:   Palliative xrt to lung and rib      TREATMENT DETAILS:     Current Radiation Dose: 250 cGy  No concurrent systemic therapy    SUBJECTIVE   Mr. Delgadillo is a 84 y.o. male seen today for his weekly on-treatment evaluation    5/5/25:  First OTV, at fraction 1.  No issues.  Went over RT schedule and answered questions.      OBJECTIVE/PHYSICAL EXAM:   VITAL SIGNS: There were no vitals taken for this visit.  Wt Readings from Last 5 Encounters:   04/25/25 59.4 kg (131 lb)   04/08/25 61.2 kg (135 lb)   03/18/25 61.1 kg (134 lb 9.6 oz)   02/21/25 62.6 kg (138 lb)          Performance status:  ECOG 2, Ambulatory/capable of all self-care, unable to perform any work activities. Up and about more than 50% of waking hours  General: Alert and conversant, in NAD      LABS:  Personally reviewed    MEDICATIONS:    Current Outpatient Medications:     acetaminophen (TYLENOL) 325 MG tablet, Take 2 tablets by mouth every 6 hours as needed for Pain, Disp: , Rfl:     oxyCODONE-acetaminophen (PERCOCET) 5-325 MG per tablet, Take 1 tablet by mouth every 4 hours as needed for Pain., Disp: , Rfl:     OXYGEN, Inhale into the lungs (Patient not taking: Reported on 3/18/2025), Disp: , Rfl:     tamsulosin (FLOMAX) 0.4 MG capsule, Take 1 capsule by mouth daily, Disp: , Rfl:     amLODIPine (NORVASC) 10 MG tablet, Take 1 tablet by mouth daily, Disp: , Rfl:       ASSESSMENT & PLAN:   - Continue radiation treatment as planned  -no issues.   - Treatment setup and plan reviewed. Port images/CBCT images reviewed.

## 2025-05-06 ENCOUNTER — HOSPITAL ENCOUNTER (OUTPATIENT)
Facility: HOSPITAL | Age: 85
Discharge: HOME | End: 2025-05-09
Attending: STUDENT IN AN ORGANIZED HEALTH CARE EDUCATION/TRAINING PROGRAM
Payer: MEDICARE

## 2025-05-06 LAB
RAD ONC ARIA COURSE FIRST TREATMENT DATE: NORMAL
RAD ONC ARIA COURSE ID: NORMAL
RAD ONC ARIA COURSE INTENT: NORMAL
RAD ONC ARIA COURSE LAST TREATMENT DATE: NORMAL
RAD ONC ARIA COURSE SESSION NUMBER: 2
RAD ONC ARIA COURSE START DATE: NORMAL
RAD ONC ARIA COURSE TREATMENT ELAPSED DAYS: 1
RAD ONC ARIA PLAN FRACTIONS TREATED TO DATE: 2
RAD ONC ARIA PLAN ID: NORMAL
RAD ONC ARIA PLAN PRESCRIBED DOSE PER FRACTION: 2.5 GY
RAD ONC ARIA PLAN PRIMARY REFERENCE POINT: NORMAL
RAD ONC ARIA PLAN TOTAL FRACTIONS PRESCRIBED: 15
RAD ONC ARIA PLAN TOTAL PRESCRIBED DOSE: 3750 CGY
RAD ONC ARIA REFERENCE POINT DOSAGE GIVEN TO DATE: 2.84 GY
RAD ONC ARIA REFERENCE POINT DOSAGE GIVEN TO DATE: 5 GY
RAD ONC ARIA REFERENCE POINT ID: NORMAL
RAD ONC ARIA REFERENCE POINT ID: NORMAL
RAD ONC ARIA REFERENCE POINT SESSION DOSAGE GIVEN: 1.42 GY
RAD ONC ARIA REFERENCE POINT SESSION DOSAGE GIVEN: 2.5 GY

## 2025-05-06 PROCEDURE — 77386 HC NTSTY MODUL RAD TX DLVR CPLX: CPT

## 2025-05-08 ENCOUNTER — HOSPITAL ENCOUNTER (OUTPATIENT)
Facility: HOSPITAL | Age: 85
Discharge: HOME | End: 2025-05-11
Attending: STUDENT IN AN ORGANIZED HEALTH CARE EDUCATION/TRAINING PROGRAM
Payer: MEDICARE

## 2025-05-08 LAB
RAD ONC ARIA COURSE FIRST TREATMENT DATE: NORMAL
RAD ONC ARIA COURSE ID: NORMAL
RAD ONC ARIA COURSE INTENT: NORMAL
RAD ONC ARIA COURSE LAST TREATMENT DATE: NORMAL
RAD ONC ARIA COURSE SESSION NUMBER: 3
RAD ONC ARIA COURSE START DATE: NORMAL
RAD ONC ARIA COURSE TREATMENT ELAPSED DAYS: 3
RAD ONC ARIA PLAN FRACTIONS TREATED TO DATE: 3
RAD ONC ARIA PLAN ID: NORMAL
RAD ONC ARIA PLAN PRESCRIBED DOSE PER FRACTION: 2.5 GY
RAD ONC ARIA PLAN PRIMARY REFERENCE POINT: NORMAL
RAD ONC ARIA PLAN TOTAL FRACTIONS PRESCRIBED: 15
RAD ONC ARIA PLAN TOTAL PRESCRIBED DOSE: 3750 CGY
RAD ONC ARIA REFERENCE POINT DOSAGE GIVEN TO DATE: 4.26 GY
RAD ONC ARIA REFERENCE POINT DOSAGE GIVEN TO DATE: 7.5 GY
RAD ONC ARIA REFERENCE POINT ID: NORMAL
RAD ONC ARIA REFERENCE POINT ID: NORMAL
RAD ONC ARIA REFERENCE POINT SESSION DOSAGE GIVEN: 1.42 GY
RAD ONC ARIA REFERENCE POINT SESSION DOSAGE GIVEN: 2.5 GY

## 2025-05-08 PROCEDURE — 77386 HC NTSTY MODUL RAD TX DLVR CPLX: CPT

## 2025-05-09 ENCOUNTER — HOSPITAL ENCOUNTER (OUTPATIENT)
Facility: HOSPITAL | Age: 85
Discharge: HOME | End: 2025-05-12
Attending: STUDENT IN AN ORGANIZED HEALTH CARE EDUCATION/TRAINING PROGRAM
Payer: MEDICARE

## 2025-05-09 LAB
RAD ONC ARIA COURSE FIRST TREATMENT DATE: NORMAL
RAD ONC ARIA COURSE ID: NORMAL
RAD ONC ARIA COURSE INTENT: NORMAL
RAD ONC ARIA COURSE LAST TREATMENT DATE: NORMAL
RAD ONC ARIA COURSE SESSION NUMBER: 4
RAD ONC ARIA COURSE START DATE: NORMAL
RAD ONC ARIA COURSE TREATMENT ELAPSED DAYS: 4
RAD ONC ARIA PLAN FRACTIONS TREATED TO DATE: 4
RAD ONC ARIA PLAN ID: NORMAL
RAD ONC ARIA PLAN PRESCRIBED DOSE PER FRACTION: 2.5 GY
RAD ONC ARIA PLAN PRIMARY REFERENCE POINT: NORMAL
RAD ONC ARIA PLAN TOTAL FRACTIONS PRESCRIBED: 15
RAD ONC ARIA PLAN TOTAL PRESCRIBED DOSE: 3750 CGY
RAD ONC ARIA REFERENCE POINT DOSAGE GIVEN TO DATE: 10 GY
RAD ONC ARIA REFERENCE POINT DOSAGE GIVEN TO DATE: 5.68 GY
RAD ONC ARIA REFERENCE POINT ID: NORMAL
RAD ONC ARIA REFERENCE POINT ID: NORMAL
RAD ONC ARIA REFERENCE POINT SESSION DOSAGE GIVEN: 1.42 GY
RAD ONC ARIA REFERENCE POINT SESSION DOSAGE GIVEN: 2.5 GY

## 2025-05-09 PROCEDURE — 77386 HC NTSTY MODUL RAD TX DLVR CPLX: CPT

## 2025-05-12 ENCOUNTER — HOSPITAL ENCOUNTER (OUTPATIENT)
Facility: HOSPITAL | Age: 85
Discharge: HOME | End: 2025-05-15
Attending: STUDENT IN AN ORGANIZED HEALTH CARE EDUCATION/TRAINING PROGRAM
Payer: MEDICARE

## 2025-05-12 DIAGNOSIS — C34.11 MALIGNANT NEOPLASM OF RIGHT UPPER LOBE OF LUNG (HCC): Primary | ICD-10-CM

## 2025-05-12 LAB
RAD ONC ARIA COURSE FIRST TREATMENT DATE: NORMAL
RAD ONC ARIA COURSE ID: NORMAL
RAD ONC ARIA COURSE INTENT: NORMAL
RAD ONC ARIA COURSE LAST TREATMENT DATE: NORMAL
RAD ONC ARIA COURSE SESSION NUMBER: 5
RAD ONC ARIA COURSE START DATE: NORMAL
RAD ONC ARIA COURSE TREATMENT ELAPSED DAYS: 7
RAD ONC ARIA PLAN FRACTIONS TREATED TO DATE: 5
RAD ONC ARIA PLAN ID: NORMAL
RAD ONC ARIA PLAN PRESCRIBED DOSE PER FRACTION: 2.5 GY
RAD ONC ARIA PLAN PRIMARY REFERENCE POINT: NORMAL
RAD ONC ARIA PLAN TOTAL FRACTIONS PRESCRIBED: 15
RAD ONC ARIA PLAN TOTAL PRESCRIBED DOSE: 3750 CGY
RAD ONC ARIA REFERENCE POINT DOSAGE GIVEN TO DATE: 12.5 GY
RAD ONC ARIA REFERENCE POINT DOSAGE GIVEN TO DATE: 7.09 GY
RAD ONC ARIA REFERENCE POINT ID: NORMAL
RAD ONC ARIA REFERENCE POINT ID: NORMAL
RAD ONC ARIA REFERENCE POINT SESSION DOSAGE GIVEN: 1.42 GY
RAD ONC ARIA REFERENCE POINT SESSION DOSAGE GIVEN: 2.5 GY

## 2025-05-12 PROCEDURE — 77336 RADIATION PHYSICS CONSULT: CPT

## 2025-05-12 PROCEDURE — 77386 HC NTSTY MODUL RAD TX DLVR CPLX: CPT

## 2025-05-12 ASSESSMENT — PAIN DESCRIPTION - DESCRIPTORS: DESCRIPTORS: DISCOMFORT

## 2025-05-12 ASSESSMENT — PAIN DESCRIPTION - LOCATION: LOCATION: CHEST

## 2025-05-12 NOTE — PROGRESS NOTES
Cancer [place] at [facility]  [facility]      RADIATION ONCOLOGY WEEKLY PROGRESS NOTE    Encounter Date: 05/12/25   Patient Name: Donny Delgadillo  YOB: 1940  Medical Record Number: [id number]    DIAGNOSIS:       ICD-10-CM    1. Malignant neoplasm of right upper lobe of lung (HCC)  C34.11        Metastatic lung cancer.   STAGING:    Cancer Staging   No matching staging information was found for the patient.    AJCC Staging has been reviewed    ASSESSMENT:   Palliative xrt to lung and rib    TREATMENT DETAILS:   Current Radiation Dose: 1250 cGy  No concurrent systemic therapy    SUBJECTIVE   Mr. Delgadillo is a 84 y.o. male seen today for his weekly on-treatment evaluation    5/5/25:  First OTV, at fraction 1.  No issues.  Went over RT schedule and answered questions.  5/12: chest pain 6/10, unchanged from previous, still sob    OBJECTIVE/PHYSICAL EXAM:   VITAL SIGNS: There were no vitals taken for this visit.  Wt Readings from Last 5 Encounters:   04/25/25 59.4 kg (131 lb)   04/08/25 61.2 kg (135 lb)   03/18/25 61.1 kg (134 lb 9.6 oz)   02/21/25 62.6 kg (138 lb)          Performance status:  ECOG 2, Ambulatory/capable of all self-care, unable to perform any work activities. Up and about more than 50% of waking hours  General: Alert and conversant, in NAD  5/12: decreased breath sounds in right lung    LABS:  Personally reviewed    MEDICATIONS:    Current Outpatient Medications:     acetaminophen (TYLENOL) 325 MG tablet, Take 2 tablets by mouth every 6 hours as needed for Pain, Disp: , Rfl:     oxyCODONE-acetaminophen (PERCOCET) 5-325 MG per tablet, Take 1 tablet by mouth every 4 hours as needed for Pain., Disp: , Rfl:     OXYGEN, Inhale into the lungs (Patient not taking: Reported on 3/18/2025), Disp: , Rfl:     tamsulosin (FLOMAX) 0.4 MG capsule, Take 1 capsule by mouth daily, Disp: , Rfl:     amLODIPine (NORVASC) 10 MG tablet, Take 1 tablet by mouth daily,

## 2025-05-13 ENCOUNTER — HOSPITAL ENCOUNTER (OUTPATIENT)
Facility: HOSPITAL | Age: 85
Discharge: HOME | End: 2025-05-16
Attending: STUDENT IN AN ORGANIZED HEALTH CARE EDUCATION/TRAINING PROGRAM
Payer: MEDICARE

## 2025-05-13 LAB
RAD ONC ARIA COURSE FIRST TREATMENT DATE: NORMAL
RAD ONC ARIA COURSE ID: NORMAL
RAD ONC ARIA COURSE INTENT: NORMAL
RAD ONC ARIA COURSE LAST TREATMENT DATE: NORMAL
RAD ONC ARIA COURSE SESSION NUMBER: 6
RAD ONC ARIA COURSE START DATE: NORMAL
RAD ONC ARIA COURSE TREATMENT ELAPSED DAYS: 8
RAD ONC ARIA PLAN FRACTIONS TREATED TO DATE: 6
RAD ONC ARIA PLAN ID: NORMAL
RAD ONC ARIA PLAN PRESCRIBED DOSE PER FRACTION: 2.5 GY
RAD ONC ARIA PLAN PRIMARY REFERENCE POINT: NORMAL
RAD ONC ARIA PLAN TOTAL FRACTIONS PRESCRIBED: 15
RAD ONC ARIA PLAN TOTAL PRESCRIBED DOSE: 3750 CGY
RAD ONC ARIA REFERENCE POINT DOSAGE GIVEN TO DATE: 15 GY
RAD ONC ARIA REFERENCE POINT DOSAGE GIVEN TO DATE: 8.51 GY
RAD ONC ARIA REFERENCE POINT ID: NORMAL
RAD ONC ARIA REFERENCE POINT ID: NORMAL
RAD ONC ARIA REFERENCE POINT SESSION DOSAGE GIVEN: 1.42 GY
RAD ONC ARIA REFERENCE POINT SESSION DOSAGE GIVEN: 2.5 GY

## 2025-05-13 PROCEDURE — 77386 HC NTSTY MODUL RAD TX DLVR CPLX: CPT

## 2025-05-14 ENCOUNTER — HOSPITAL ENCOUNTER (OUTPATIENT)
Facility: HOSPITAL | Age: 85
Discharge: HOME | End: 2025-05-17
Attending: STUDENT IN AN ORGANIZED HEALTH CARE EDUCATION/TRAINING PROGRAM
Payer: MEDICARE

## 2025-05-14 LAB
RAD ONC ARIA COURSE FIRST TREATMENT DATE: NORMAL
RAD ONC ARIA COURSE ID: NORMAL
RAD ONC ARIA COURSE INTENT: NORMAL
RAD ONC ARIA COURSE LAST TREATMENT DATE: NORMAL
RAD ONC ARIA COURSE SESSION NUMBER: 7
RAD ONC ARIA COURSE START DATE: NORMAL
RAD ONC ARIA COURSE TREATMENT ELAPSED DAYS: 9
RAD ONC ARIA PLAN FRACTIONS TREATED TO DATE: 7
RAD ONC ARIA PLAN ID: NORMAL
RAD ONC ARIA PLAN PRESCRIBED DOSE PER FRACTION: 2.5 GY
RAD ONC ARIA PLAN PRIMARY REFERENCE POINT: NORMAL
RAD ONC ARIA PLAN TOTAL FRACTIONS PRESCRIBED: 15
RAD ONC ARIA PLAN TOTAL PRESCRIBED DOSE: 3750 CGY
RAD ONC ARIA REFERENCE POINT DOSAGE GIVEN TO DATE: 17.5 GY
RAD ONC ARIA REFERENCE POINT DOSAGE GIVEN TO DATE: 9.93 GY
RAD ONC ARIA REFERENCE POINT ID: NORMAL
RAD ONC ARIA REFERENCE POINT ID: NORMAL
RAD ONC ARIA REFERENCE POINT SESSION DOSAGE GIVEN: 1.42 GY
RAD ONC ARIA REFERENCE POINT SESSION DOSAGE GIVEN: 2.5 GY

## 2025-05-14 PROCEDURE — 77386 HC NTSTY MODUL RAD TX DLVR CPLX: CPT

## 2025-05-15 ENCOUNTER — HOSPITAL ENCOUNTER (OUTPATIENT)
Facility: HOSPITAL | Age: 85
Discharge: HOME | End: 2025-05-18
Attending: STUDENT IN AN ORGANIZED HEALTH CARE EDUCATION/TRAINING PROGRAM
Payer: MEDICARE

## 2025-05-15 LAB
RAD ONC ARIA COURSE FIRST TREATMENT DATE: NORMAL
RAD ONC ARIA COURSE ID: NORMAL
RAD ONC ARIA COURSE INTENT: NORMAL
RAD ONC ARIA COURSE LAST TREATMENT DATE: NORMAL
RAD ONC ARIA COURSE SESSION NUMBER: 8
RAD ONC ARIA COURSE START DATE: NORMAL
RAD ONC ARIA COURSE TREATMENT ELAPSED DAYS: 10
RAD ONC ARIA PLAN FRACTIONS TREATED TO DATE: 8
RAD ONC ARIA PLAN ID: NORMAL
RAD ONC ARIA PLAN PRESCRIBED DOSE PER FRACTION: 2.5 GY
RAD ONC ARIA PLAN PRIMARY REFERENCE POINT: NORMAL
RAD ONC ARIA PLAN TOTAL FRACTIONS PRESCRIBED: 15
RAD ONC ARIA PLAN TOTAL PRESCRIBED DOSE: 3750 CGY
RAD ONC ARIA REFERENCE POINT DOSAGE GIVEN TO DATE: 11.35 GY
RAD ONC ARIA REFERENCE POINT DOSAGE GIVEN TO DATE: 20 GY
RAD ONC ARIA REFERENCE POINT ID: NORMAL
RAD ONC ARIA REFERENCE POINT ID: NORMAL
RAD ONC ARIA REFERENCE POINT SESSION DOSAGE GIVEN: 1.42 GY
RAD ONC ARIA REFERENCE POINT SESSION DOSAGE GIVEN: 2.5 GY

## 2025-05-15 PROCEDURE — 77386 HC NTSTY MODUL RAD TX DLVR CPLX: CPT

## 2025-05-16 ENCOUNTER — HOSPITAL ENCOUNTER (OUTPATIENT)
Facility: HOSPITAL | Age: 85
Discharge: HOME | End: 2025-05-19
Attending: STUDENT IN AN ORGANIZED HEALTH CARE EDUCATION/TRAINING PROGRAM
Payer: MEDICARE

## 2025-05-16 LAB
RAD ONC ARIA COURSE FIRST TREATMENT DATE: NORMAL
RAD ONC ARIA COURSE ID: NORMAL
RAD ONC ARIA COURSE INTENT: NORMAL
RAD ONC ARIA COURSE LAST TREATMENT DATE: NORMAL
RAD ONC ARIA COURSE SESSION NUMBER: 9
RAD ONC ARIA COURSE START DATE: NORMAL
RAD ONC ARIA COURSE TREATMENT ELAPSED DAYS: 11
RAD ONC ARIA PLAN FRACTIONS TREATED TO DATE: 9
RAD ONC ARIA PLAN ID: NORMAL
RAD ONC ARIA PLAN PRESCRIBED DOSE PER FRACTION: 2.5 GY
RAD ONC ARIA PLAN PRIMARY REFERENCE POINT: NORMAL
RAD ONC ARIA PLAN TOTAL FRACTIONS PRESCRIBED: 15
RAD ONC ARIA PLAN TOTAL PRESCRIBED DOSE: 3750 CGY
RAD ONC ARIA REFERENCE POINT DOSAGE GIVEN TO DATE: 12.77 GY
RAD ONC ARIA REFERENCE POINT DOSAGE GIVEN TO DATE: 22.5 GY
RAD ONC ARIA REFERENCE POINT ID: NORMAL
RAD ONC ARIA REFERENCE POINT ID: NORMAL
RAD ONC ARIA REFERENCE POINT SESSION DOSAGE GIVEN: 1.42 GY
RAD ONC ARIA REFERENCE POINT SESSION DOSAGE GIVEN: 2.5 GY

## 2025-05-16 PROCEDURE — 77386 HC NTSTY MODUL RAD TX DLVR CPLX: CPT

## 2025-05-19 ENCOUNTER — APPOINTMENT (OUTPATIENT)
Facility: HOSPITAL | Age: 85
End: 2025-05-19
Attending: STUDENT IN AN ORGANIZED HEALTH CARE EDUCATION/TRAINING PROGRAM
Payer: MEDICARE

## 2025-05-20 ENCOUNTER — APPOINTMENT (OUTPATIENT)
Facility: HOSPITAL | Age: 85
End: 2025-05-20
Attending: STUDENT IN AN ORGANIZED HEALTH CARE EDUCATION/TRAINING PROGRAM
Payer: MEDICARE

## 2025-05-21 ENCOUNTER — APPOINTMENT (OUTPATIENT)
Facility: HOSPITAL | Age: 85
End: 2025-05-21
Attending: STUDENT IN AN ORGANIZED HEALTH CARE EDUCATION/TRAINING PROGRAM
Payer: MEDICARE

## 2025-05-22 ENCOUNTER — APPOINTMENT (OUTPATIENT)
Facility: HOSPITAL | Age: 85
End: 2025-05-22
Attending: STUDENT IN AN ORGANIZED HEALTH CARE EDUCATION/TRAINING PROGRAM
Payer: MEDICARE

## 2025-05-23 ENCOUNTER — CLINICAL DOCUMENTATION (OUTPATIENT)
Facility: HOSPITAL | Age: 85
End: 2025-05-23

## 2025-05-23 ENCOUNTER — APPOINTMENT (OUTPATIENT)
Facility: HOSPITAL | Age: 85
End: 2025-05-23
Attending: STUDENT IN AN ORGANIZED HEALTH CARE EDUCATION/TRAINING PROGRAM
Payer: MEDICARE

## 2025-05-23 NOTE — PROGRESS NOTES
[place]      RADIATION ONCOLOGY CLINICAL END OF TREATMENT NOTE    Encounter Date: 05/23/25   Patient Name: Donny Delgadillo  YOB: 1940  Medical Record Number: [id number]    DIAGNOSIS:   No diagnosis found.  STAGING:   Cancer Staging   No matching staging information was found for the patient.    AJCC Staging has been reviewed    ASSESSMENT:   Palliative xrt to lung and rib    TREATMENT SUMMARY:     Treatment Site Modality Dose/Fx (cGy) #Fx Total Dose (cGy) Start Date End Date Elapsed Days   Rib IMRT 250 9 2250 5/5/25 5/16/25 11               CONCURRENT THERAPY: None    TREATMENT RESPONSE:  Treatment suspended due to patient passing away.    MEDICATIONS:     Current Outpatient Medications:     acetaminophen (TYLENOL) 325 MG tablet, Take 2 tablets by mouth every 6 hours as needed for Pain, Disp: , Rfl:     oxyCODONE-acetaminophen (PERCOCET) 5-325 MG per tablet, Take 1 tablet by mouth every 4 hours as needed for Pain., Disp: , Rfl:     OXYGEN, Inhale into the lungs (Patient not taking: Reported on 3/18/2025), Disp: , Rfl:     tamsulosin (FLOMAX) 0.4 MG capsule, Take 1 capsule by mouth daily, Disp: , Rfl:     amLODIPine (NORVASC) 10 MG tablet, Take 1 tablet by mouth daily, Disp: , Rfl:       UNDER-TREATMENT COURSE SUMMARY:   Overall tolerated treatment well.  Experienced chest pain and sob.  Patient passed away during treatment.  Discussed could develop symptoms the first week or two after radiation completes.  Otherwise no other significant side effects.      FOLLOW UP:   Patient passed away    Francis Zurita MD  Radiation Oncology  [address]  [address]  [place] [address] [phone]

## 2025-05-27 ENCOUNTER — APPOINTMENT (OUTPATIENT)
Facility: HOSPITAL | Age: 85
End: 2025-05-27
Attending: STUDENT IN AN ORGANIZED HEALTH CARE EDUCATION/TRAINING PROGRAM
Payer: MEDICARE

## 2025-07-07 NOTE — PROGRESS NOTES
4 Eyes Skin Assessment     NAME:  Donny Delgadillo  YOB: 1940  MEDICAL RECORD NUMBER:  [id number]    The patient is being assessed for  Other weekly skin    I agree that at least one RN has performed a thorough Head to Toe Skin Assessment on the patient. ALL assessment sites listed below have been assessed.      Areas assessed by both nurses:    Head, Face, Ears, Shoulders, Back, Chest, Arms, Elbows, Hands, Sacrum. Buttock, Coccyx, Ischium, Legs. Feet and Heels, and Under Medical Devices         Does the Patient have a Wound? No noted wound(s)       Shaquille Prevention initiated by RN: No  Wound Care Orders initiated by RN: No    Pressure Injury (Stage 3,4, Unstageable, DTI, NWPT, and Complex wounds) if present, place Wound referral order by RN under : No    New Ostomies, if present place, Ostomy referral order under : No     Nurse 1 eSignature: Electronically signed by Rubia Clayton RN on 2/26/25 at 8:10 AM EST    **SHARE this note so that the co-signing nurse can place an eSignature**    Nurse 2 eSignature: {Esignature:[id number]}    120